# Patient Record
Sex: MALE | Race: WHITE | URBAN - METROPOLITAN AREA
[De-identification: names, ages, dates, MRNs, and addresses within clinical notes are randomized per-mention and may not be internally consistent; named-entity substitution may affect disease eponyms.]

---

## 2020-06-30 ENCOUNTER — OFFICE VISIT (OUTPATIENT)
Dept: CARDIOLOGY CLINIC | Age: 25
End: 2020-06-30

## 2020-06-30 VITALS
WEIGHT: 263.2 LBS | HEIGHT: 74 IN | HEART RATE: 75 BPM | SYSTOLIC BLOOD PRESSURE: 118 MMHG | DIASTOLIC BLOOD PRESSURE: 82 MMHG | BODY MASS INDEX: 33.78 KG/M2 | RESPIRATION RATE: 16 BRPM | TEMPERATURE: 98.9 F | OXYGEN SATURATION: 97 %

## 2020-06-30 DIAGNOSIS — R06.02 SHORTNESS OF BREATH: Primary | ICD-10-CM

## 2020-06-30 DIAGNOSIS — I50.9 CONGESTIVE HEART FAILURE, UNSPECIFIED HF CHRONICITY, UNSPECIFIED HEART FAILURE TYPE (HCC): ICD-10-CM

## 2020-06-30 DIAGNOSIS — F17.200 NICOTINE DEPENDENCE, UNCOMPLICATED, UNSPECIFIED NICOTINE PRODUCT TYPE: ICD-10-CM

## 2020-06-30 DIAGNOSIS — R73.09 HIGH GLUCOSE LEVEL: ICD-10-CM

## 2020-06-30 DIAGNOSIS — F17.220 CHEWING TOBACCO NICOTINE DEPENDENCE WITHOUT COMPLICATION: ICD-10-CM

## 2020-06-30 DIAGNOSIS — E61.1 LOW IRON: ICD-10-CM

## 2020-06-30 DIAGNOSIS — E55.9 VITAMIN D DEFICIENCY: ICD-10-CM

## 2020-06-30 DIAGNOSIS — R53.83 FATIGUE, UNSPECIFIED TYPE: ICD-10-CM

## 2020-06-30 RX ORDER — SACUBITRIL AND VALSARTAN 49; 51 MG/1; MG/1
1 TABLET, FILM COATED ORAL 2 TIMES DAILY
Qty: 60 TAB | Refills: 2 | Status: SHIPPED | OUTPATIENT
Start: 2020-06-30 | End: 2020-07-21 | Stop reason: DRUGHIGH

## 2020-06-30 RX ORDER — FUROSEMIDE 20 MG/1
20 TABLET ORAL DAILY
COMMUNITY
Start: 2020-06-10 | End: 2020-06-30 | Stop reason: ALTCHOICE

## 2020-06-30 RX ORDER — SACUBITRIL AND VALSARTAN 24; 26 MG/1; MG/1
1 TABLET, FILM COATED ORAL DAILY
COMMUNITY
Start: 2020-06-13 | End: 2020-06-30 | Stop reason: DRUGHIGH

## 2020-06-30 RX ORDER — CARVEDILOL 6.25 MG/1
12.5 TABLET ORAL 2 TIMES DAILY WITH MEALS
COMMUNITY
Start: 2020-06-22 | End: 2020-10-02

## 2020-06-30 NOTE — LETTER
6/30/2020 3:24 PM 
 
Patient:  Michael Esquivel YOB: 1995 Date of Visit: 6/30/2020 Dear Neris Alejandro [place] [id number] [place] [id number] VIA Facsimile: [phone]: Thank you for referring Mr. Michael Esquivel to me for evaluation/treatment. Below are the relevant portions of my assessment and plan of care. If you have questions, please do not hesitate to call me. I look forward to following Mr. David Hughes along with you. Sincerely, Darius Lynn MD

## 2020-06-30 NOTE — PATIENT INSTRUCTIONS
Medication changes:    STOP furosemide (Lasix)    INCREASE sacubitril-valsartan (Entresto) to 49mg/51mg- You may take two 24/26mg tablets by mouth twice daily until you run out. The new prescription will be for one 49mg/51mg tablet by mouth twice daily. Testing Ordered:    Lab work, including [facility], has been ordered. You will be contacted for any abnormal results. Lab work has been ordered. Please present to a [facility] location of your choice with the orders provided to have lab work done. Please wear gloves and a mask when you present to [facility] and practice diligent hand hygeine before and after your visit. Our office will notify you of any abnormal results. Other Recommendations:      Ensure your drinking an adequate amount of water with a goal of 6-8 eight ounce glasses (1.5-2 liters) of fluid daily. Your urine should be clear and light yellow straw colored. If your blood pressure begins to consistently run below 90/60 and/or you begin to experience dizziness or lightheadedness, please contact the [facility] at [phone]. Follow up 3 weeks with [facility] MD for VIRTUAL VISIT      Please monitor your blood pressures daily prior to medications and 2 hours after taking medications. Bring a written record of your blood pressures to your next appointment. Please monitor your weights daily upon waking and after using the bathroom. Keep a written records of your weights and bring to your next appointment. If you have a weight gain of 3 or more pounds overnight OR 5 or more pounds in one week please contact our office. Thank you for allowing us the privilege of being a part of your healthcare team! Please do not hesitate to contact our office at [phone] with any questions or concerns.              Heart Failure: Care Instructions  Your Care Instructions     Heart failure occurs when your heart does not pump as much blood as the body needs. Failure does not mean that the heart has stopped pumping but rather that it is not pumping as well as it should. Over time, this causes fluid buildup in your lungs and other parts of your body. Fluid buildup can cause shortness of breath, fatigue, swollen ankles, and other problems. By taking medicines regularly, reducing sodium (salt) in your diet, checking your weight every day, and making lifestyle changes, you can feel better and live longer. Follow-up care is a key part of your treatment and safety. Be sure to make and go to all appointments, and call your doctor if you are having problems. It's also a good idea to know your test results and keep a list of the medicines you take. How can you care for yourself at home? Medicines  · Be safe with medicines. Take your medicines exactly as prescribed. Call your doctor if you think you are having a problem with your medicine. · Do not take any vitamins, over-the-counter medicine, or herbal products without talking to your doctor first. Andree Him not take ibuprofen (Advil or Motrin) and naproxen (Aleve) without talking to your doctor first. They could make your heart failure worse. · You may take some of the following medicine. ? Angiotensin-converting enzyme inhibitors (ACEIs) or angiotensin II receptor blockers (ARBs) reduce the heart's workload, lower blood pressure, and reduce swelling. Taking an ACEI or ARB may lower your chance of needing to be hospitalized. ? Beta-blockers can slow heart rate, decrease blood pressure, and improve your condition. Taking a beta-blocker may lower your chance of needing to be hospitalized. ? Diuretics, also called water pills, reduce swelling. You will get more details on the specific medicines your doctor prescribes. Diet  · Your doctor may suggest that you limit sodium. Your doctor can tell you how much sodium is right for you. An example is less than 3,000 mg a day.  This includes all the salt you eat in cooking or in packaged foods. People get most of their sodium from processed foods. Fast food and restaurant meals also tend to be very high in sodium. · Ask your doctor how much liquid you can drink each day. You may have to limit liquids. Weight  · Weigh yourself without clothing at the same time each day. Record your weight. Call your doctor if you have a sudden weight gain, such as more than 2 to 3 pounds in a day or 5 pounds in a week. (Your doctor may suggest a different range of weight gain.) A sudden weight gain may mean that your heart failure is getting worse. Activity level  · Start light exercise (if your doctor says it is okay). Even if you can only do a small amount, exercise will help you get stronger, have more energy, and manage your weight and your stress. Walking is an easy way to get exercise. Start out by walking a little more than you did before. Bit by bit, increase the amount you walk. · When you exercise, watch for signs that your heart is working too hard. You are pushing yourself too hard if you cannot talk while you are exercising. If you become short of breath or dizzy or have chest pain, stop, sit down, and rest.  · If you feel \"wiped out\" the day after you exercise, walk slower or for a shorter distance until you can work up to a better pace. · Get enough rest at night. Sleeping with 1 or 2 pillows under your upper body and head may help you breathe easier. Lifestyle changes  · Do not smoke. Smoking can make a heart condition worse. If you need help quitting, talk to your doctor about stop-smoking programs and medicines. These can increase your chances of quitting for good. Quitting smoking may be the most important step you can take to protect your heart. · Limit alcohol to 2 drinks a day for men and 1 drink a day for women. Too much alcohol can cause health problems. · Avoid getting sick from colds and the flu. Get a pneumococcal vaccine shot.  If you have had one before, ask your doctor whether you need another dose. Get a flu shot each year. If you must be around people with colds or the flu, wash your hands often. When should you call for help? Call 911 if you have symptoms of sudden heart failure such as:  · You have severe trouble breathing. · You cough up pink, foamy mucus. · You have a new irregular or rapid heartbeat. Call your doctor now or seek immediate medical care if:  · You have new or increased shortness of breath. · You are dizzy or lightheaded, or you feel like you may faint. · You have sudden weight gain, such as more than 2 to 3 pounds in a day or 5 pounds in a week. (Your doctor may suggest a different range of weight gain.)  · You have increased swelling in your legs, ankles, or feet. · You are suddenly so tired or weak that you cannot do your usual activities. Watch closely for changes in your health, and be sure to contact your doctor if you develop new symptoms. Where can you learn more? Go to http://nellie-jose.info/  Enter U484 in the search box to learn more about \"Heart Failure: Care Instructions. \"  Current as of: December 16, 2019               Content Version: 12.5  © 3373-8554 Healthwise, Incorporated. Care instructions adapted under license by [employer] (which disclaims liability or warranty for this information). If you have questions about a medical condition or this instruction, always ask your healthcare professional. [place] [address] any warranty or liability for your use of this information. Low Sodium Diet (2,000 Milligram): Care Instructions  Your Care Instructions     Too much sodium causes your body to hold on to extra water. This can raise your blood pressure and force your heart and kidneys to work harder. In very serious cases, this could cause you to be put in the hospital. It might even be life-threatening.  By limiting sodium, you will feel better and lower your risk of serious problems. The most common source of sodium is salt. People get most of the salt in their diet from canned, prepared, and packaged foods. Fast food and restaurant meals also are very high in sodium. Your doctor will probably limit your sodium to less than 2,000 milligrams (mg) a day. This limit counts all the sodium in prepared and packaged foods and any salt you add to your food. Follow-up care is a key part of your treatment and safety. Be sure to make and go to all appointments, and call your doctor if you are having problems. It's also a good idea to know your test results and keep a list of the medicines you take. How can you care for yourself at home? Read food labels  · Read labels on cans and food packages. The labels tell you how much sodium is in each serving. Make sure that you look at the serving size. If you eat more than the serving size, you have eaten more sodium. · Food labels also tell you the Percent Daily Value for sodium. Choose products with low Percent Daily Values for sodium. · Be aware that sodium can come in forms other than salt, including monosodium glutamate (MSG), sodium citrate, and sodium bicarbonate (baking soda). MSG is often added to Asian food. When you eat out, you can sometimes ask for food without MSG or added salt. Buy low-sodium foods  · Buy foods that are labeled \"unsalted\" (no salt added), \"sodium-free\" (less than 5 mg of sodium per serving), or \"low-sodium\" (less than 140 mg of sodium per serving). Foods labeled \"reduced-sodium\" and \"light sodium\" may still have too much sodium. Be sure to read the label to see how much sodium you are getting. · Buy fresh vegetables, or frozen vegetables without added sauces. Buy low-sodium versions of canned vegetables, soups, and other canned goods. Prepare low-sodium meals  · Cut back on the amount of salt you use in cooking. This will help you adjust to the taste. Do not add salt after cooking.  One teaspoon of salt has about 2,300 mg of sodium. · Take the salt shaker off the table. · Flavor your food with garlic, lemon juice, onion, vinegar, herbs, and spices. Do not use soy sauce, lite soy sauce, steak sauce, onion salt, garlic salt, celery salt, mustard, or ketchup on your food. · Use low-sodium salad dressings, sauces, and ketchup. Or make your own salad dressings and sauces without adding salt. · Use less salt (or none) when recipes call for it. You can often use half the salt a recipe calls for without losing flavor. Other foods such as rice, pasta, and grains do not need added salt. · Rinse canned vegetables, and cook them in fresh water. This removes somebut not allof the salt. · Avoid water that is naturally high in sodium or that has been treated with water softeners, which add sodium. Call your local water company to find out the sodium content of your water supply. If you buy bottled water, read the label and choose a sodium-free brand. Avoid high-sodium foods  · Avoid eating:  ? Smoked, cured, salted, and canned meat, fish, and poultry. ? Ham, fajardo, hot dogs, and luncheon meats. ? Regular, hard, and processed cheese and regular peanut butter. ? Crackers with salted tops, and other salted snack foods such as pretzels, chips, and salted popcorn. ? Frozen prepared meals, unless labeled low-sodium. ? Canned and dried soups, broths, and bouillon, unless labeled sodium-free or low-sodium. ? Canned vegetables, unless labeled sodium-free or low-sodium. ? [place] fries, pizza, tacos, and other fast foods. ? Pickles, olives, ketchup, and other condiments, especially soy sauce, unless labeled sodium-free or low-sodium. Where can you learn more? Go to http://nellie-jose.info/  Enter V843 in the search box to learn more about \"Low Sodium Diet (2,000 Milligram): Care Instructions. \"  Current as of: August 22, 2019               Content Version: 12.5  © 6060-6330 Healthwise, Incorporated.    Care instructions adapted under license by [employer] (which disclaims liability or warranty for this information). If you have questions about a medical condition or this instruction, always ask your healthcare professional. [address] any warranty or liability for your use of this information     Potassium-Restricted Diet: Care Instructions  Your Care Instructions     Potassium is a mineral. It helps keep the right mix of fluids in your body. It also helps your nerves and muscles work as they should. Most people get the potassium they need from the foods they eat. But if you have certain health problems, such as kidney disease, you may need to be careful about how much potassium you get. This is because too much potassium can be harmful. You can control how much potassium you get. You can do this if you eat foods that don't have much of it and you don't eat foods that have lots of it. Follow-up care is a key part of your treatment and safety. Be sure to make and go to all appointments, and call your doctor if you are having problems. It's also a good idea to know your test results and keep a list of the medicines you take. How can you care for yourself at home? · Limit foods that are high in potassium. Potassium is in many foods, such as vegetables, fruits, and milk products. High-potassium foods include:  ? Fruits such as bananas, oranges, and cantaloupe. ? Tomatoes. ? Broccoli. ? Milk. ? Spinach. ? Potatoes and sweet potatoes. · Eat foods that don't have as much potassium. These low-potassium foods include:  ? Fruits such as applesauce, pineapple, grapes, blueberries, and raspberries. ? Cucumbers. ? Hummus. ? White or brown rice. ? Spaghetti. ? Tortillas. ? Macaroni. · Do not use a salt substitute or \"lite\" salt unless you talk to your doctor first. These often are very high in potassium.   · Be sure to tell your doctor about any prescription or over-the-counter medicines you are taking. Some medicines can increase the potassium in your body. Where can you learn more? Go to http://nellie-jose.info/  Enter M262 in the search box to learn more about \"Potassium-Restricted Diet: Care Instructions. \"  Current as of: August 22, 2019               Content Version: 12.5  © 5888-4121 [employer]. Care instructions adapted under license by [facility] (which disclaims liability or warranty for this information). If you have questions about a medical condition or this instruction, always ask your healthcare professional. [address] any warranty or liability for your use of this information     Heart-Healthy Diet: Care Instructions  Your Care Instructions     A heart-healthy diet has lots of vegetables, fruits, nuts, beans, and whole grains, and is low in salt. It limits foods that are high in saturated fat, such as meats, cheeses, and fried foods. It may be hard to change your diet, but even small changes can lower your risk of heart attack and heart disease. Follow-up care is a key part of your treatment and safety. Be sure to make and go to all appointments, and call your doctor if you are having problems. It's also a good idea to know your test results and keep a list of the medicines you take. How can you care for yourself at home? Watch your portions  · Learn what a serving is. A \"serving\" and a \"portion\" are not always the same thing. Make sure that you are not eating larger portions than are recommended. For example, a serving of pasta is ½ cup. A serving size of meat is 2 to 3 ounces. A 3-ounce serving is about the size of a deck of cards. Measure serving sizes until you are good at [place]" them. Keep in mind that restaurants often serve portions that are 2 or 3 times the size of one serving. · To keep your energy level up and keep you from feeling hungry, eat often but in smaller portions.   · Eat only the number of calories you need to stay at a healthy weight. If you need to lose weight, eat fewer calories than your body burns (through exercise and other physical activity). Eat more fruits and vegetables  · Eat a variety of fruit and vegetables every day. Dark green, deep orange, red, or yellow fruits and vegetables are especially good for you. Examples include spinach, carrots, peaches, and berries. · Keep carrots, celery, and other veggies handy for snacks. Buy fruit that is in season and store it where you can see it so that you will be tempted to eat it. · Cook dishes that have a lot of veggies in them, such as stir-fries and soups. Limit saturated and trans fat  · Read food labels, and try to avoid saturated and trans fats. They increase your risk of heart disease. · Use olive or canola oil when you cook. · Bake, broil, grill, or steam foods instead of frying them. · Choose lean meats instead of high-fat meats such as hot dogs and sausages. Cut off all visible fat when you prepare meat. · Eat fish, skinless poultry, and meat alternatives such as soy products instead of high-fat meats. Soy products, such as tofu, may be especially good for your heart. · Choose low-fat or fat-free milk and dairy products. Eat foods high in fiber  · Eat a variety of grain products every day. Include whole-grain foods that have lots of fiber and nutrients. Examples of whole-grain foods include oats, whole wheat bread, and brown rice. · Buy whole-grain breads and cereals, instead of white bread or pastries. Limit salt and sodium  · Limit how much salt and sodium you eat to help lower your blood pressure. · Taste food before you salt it. Add only a little salt when you think you need it. With time, your taste buds will adjust to less salt. · Eat fewer snack items, fast foods, and other high-salt, processed foods. Check food labels for the amount of sodium in packaged foods.   · Choose low-sodium versions of canned goods (such as soups, vegetables, and beans). Limit sugar  · Limit drinks and foods with added sugar. These include candy, desserts, and soda pop. Limit alcohol  · Limit alcohol to no more than 2 drinks a day for men and 1 drink a day for women. Too much alcohol can cause health problems. When should you call for help? Watch closely for changes in your health, and be sure to contact your doctor if:  · You would like help planning heart-healthy meals. Where can you learn more? Go to http://nellieVoradiusjose.info/  Enter V137 in the search box to learn more about \"Heart-Healthy Diet: Care Instructions. \"  Current as of: August 22, 2019               Content Version: 12.5  © 4559-8046 Healthwise, Incorporated. Care instructions adapted under license by [employer] (which disclaims liability or warranty for this information). If you have questions about a medical condition or this instruction, always ask your healthcare professional. [place] [address] any warranty or liability for your use of this information. Stopping Smokeless Tobacco Use: Care Instructions  Your Care Instructions     Smokeless tobacco comes in many forms, such as snuff and chewing tobacco:  · Snuff is finely ground tobacco sold in cans or pouches. Most of the time, snuff is used by putting a \"pinch\" or \"dip\" between the lower lip or cheek and the gum. · Chewing tobacco is sold as loose leaves, plugs, or twists. It is chewed or placed between the cheek and the gum or teeth. There are plenty of reasons to stop using smokeless tobacco. These products are harmful. They are not risk-free alternatives to smoking. Smokeless tobacco contains nicotine, which is addicting.  Though using smokeless tobacco is less harmful than smoking cigarettes, it can cause serious health problems, such as:  · White patches or red sores in your mouth that can turn into mouth cancer involving the lip, tongue, or cheek.  · Tooth loss and other dental problems. · Gum disease. Your gums may pull away from your teeth and not grow back. People who use smokeless tobacco crave the nicotine in it. Giving up smokeless tobacco is much harder than simply changing a habit. Your body has to stop craving the nicotine. It is hard to quit, but you can do it. Many tools are available for people who want to quit using smokeless tobacco. You may find that combining tools works best for you. There are several steps to quitting. First you get ready to quit. Then you get support to help you. After that, you learn new skills and behaviors to quit. For many people, a necessary step is getting and using medicine. Your doctor will help you set up the plan that best meets your needs. You may want to attend a tobacco cessation program. When you choose a program, look for one that has proven success. Ask your doctor for ideas. You will greatly increase your chances of success if you take medicine as well as get counseling or join a cessation program.  Some of the changes you feel when you first quit smokeless tobacco are uncomfortable. Your body will miss the nicotine at first, and you may feel short-tempered and grumpy. You may have trouble sleeping or concentrating. Medicine can help you deal with these symptoms. You may struggle with changing your habits and rituals. The last step is the tricky one: Be prepared for the urge to use smokeless tobacco to continue for a time. This is a lot to deal with, but keep at it. You will feel better. Follow-up care is a key part of your treatment and safety. Be sure to make and go to all appointments, and call your doctor if you are having problems. It's also a good idea to know your test results and keep a list of the medicines you take. How can you care for yourself at home? · Ask your family, friends, and coworkers for support. You have a better chance of quitting if you have help and support.   · Join a support group for people who are trying to quit using smokeless tobacco.  · Set a quit date. Pick your date carefully so that it is not right in the middle of a big deadline or stressful time. After you quit, do not use smokeless tobacco even once. Get rid of all spit cups, cans, and pouches after your last use. Clean your house and your clothes so that they do not smell of tobacco.  · Learn how to be a non-user. Think about ways you can avoid those things that make you reach for tobacco.  ? Learn some ways to deal with cravings, like calling a friend or going for a walk. Cravings often pass. ? Avoid situations that put you at greatest risk for using smokeless tobacco. For some people, it is hard to spend time with friends without dipping or chewing. For others, they might skip a coffee break with coworkers who smoke or use smokeless tobacco.  ? Change your daily routine. Take a different route to work, or eat a meal in a different place. · Cut down on stress. Calm yourself or release tension by doing an activity you enjoy, such as reading a book, taking a hot bath, or gardening. · Talk to your doctor or pharmacist about nicotine replacement therapy. You still get nicotine, but you do not use tobacco. Nicotine replacement products help you slowly reduce the amount of nicotine you need. Many of these products are available over the counter. They include nicotine patches, gum, lozenges, and inhalers. · Ask your doctor about bupropion (Wellbutrin) or varenicline (Chantix), which are prescription medicines. They do not contain nicotine. They help you by reducing withdrawal symptoms, such as stress and anxiety. · Get regular exercise. Having healthy habits will help your body move past its craving for nicotine. · Be prepared to keep trying. Most people are not successful the first few times they try to quit. Do not get mad at yourself if you use tobacco again.  Make a list of things you learned, and think about when you want to try again, such as next week, next month, or next year. Where can you learn more? Go to http://nellie-jose.info/  Enter M432 in the search box to learn more about \"Stopping Smokeless Tobacco Use: Care Instructions. \"  Current as of: March 12, 2020               Content Version: 12.5  © 0700-6125 [employer]. Care instructions adapted under license by [employer] (which disclaims liability or warranty for this information). If you have questions about a medical condition or this instruction, always ask your healthcare professional. [address] any warranty or liability for your use of this information.

## 2020-06-30 NOTE — PROGRESS NOTES
[facility] Consultation Note      DOS:   6/30/2020  NAME:  Tone Moses   MRN:   [id number]   REFERRING PROVIDER:  Dr. Shalom Bates, [facility]  PRIMARY CARE PHYSICIAN: Anisa Garza, [place]  PRIMARY CARDIOLOGIST: Dr. Martha Alcantara      Chief Complaint:   Chief Complaint   Patient presents with    CHF       HPI: 22y.o. year old male with a history of new onset NICM, frequent PVCs  who presents for further evaluation of his chronic systolic heart failure. He was admitted to [facility] on 5/12/2020 with acute onset of dyspnea and lower extremity edema. He was diuresed and started on GMDT. His SARS-CoV 2 PCR was negative. His viral panel revealed positive coxsackie B5 antibody at 1:64, positive coxsackie B6 antibody at 1:8, parvovirus IgG 8, mycoplasma IgG antibody 457. His EBV IgG > 8. His HIV, Hep C, HBsAg, HHV 6 and CMV were negative. A serum drug screen was negative. His Troponin was elevated at 55 and NT proBNP 8168. His TSH was elevated at 5.42 but free T3 was normal at 3.6 and free T4 was normal at 1.6. His D-dimer was elevated at 2.09. His medications have been limited by symtpomatic hypotension with SBP in the 70s mmHg. He underwent a cardiac MRI on 5/29/2020 which revealed severe biventricular failure with LVEF 19% and RVEF 30%. He did not have gadolinium enhancement. A 24 hour Holter revealed a PVC burden of 19%. Vandana Biswas presents today for further evaluation of his NICM. He has a detailed log of his BP and weight. He denies symptomatic hypotension after starting entresto. He also denies orthopnea, PND, edema, palpitations, syncope. He admits PRATHER and occasional dizziness. He has not returned to work, where he is exposed to extreme heat, often with temps > 100 F. He is currently on short term disability. Vandana Biswas lives with his parents and he has primary custody of his 1year old daughter. He has not consumed alcohol since his diagnosis and denies any use of illicit drugs.  He formerly smoked cigarettes on occasion but quit 1 year ago. He continues to use smokeless tobacco, dipping twice daily and using 1/2 can a day. History:  Past Medical History:   Diagnosis Date    Frequent PVCs     NICM (nonischemic cardiomyopathy) ([facility] [address]      No past surgical history on file. Social History     Socioeconomic History    Marital status: SINGLE     Spouse name: Not on file    Number of children: 1    Years of education: Not on file    Highest education level: Not on file   Occupational History    Not on file   Social Needs    Financial resource strain: Not on file    Food insecurity     Worry: Not on file     Inability: Not on file    Transportation needs     Medical: Not on file     Non-medical: Not on file   Tobacco Use    Smoking status: Former Smoker     Last attempt to quit: 2019     Years since quittin.4    Smokeless tobacco: Current User     Types: Chew   Substance and Sexual Activity    Alcohol use: Not Currently    Drug use: Never    Sexual activity: Not on file   Lifestyle    Physical activity     Days per week: Not on file     Minutes per session: Not on file    Stress: Not on file   Relationships    Social connections     Talks on phone: Not on file     Gets together: Not on file     Attends Evangelical service: Not on file     Active member of club or organization: Not on file     Attends meetings of clubs or organizations: Not on file     Relationship status: Not on file    Intimate partner violence     Fear of current or ex partner: Not on file     Emotionally abused: Not on file     Physically abused: Not on file     Forced sexual activity: Not on file   Other Topics Concern    Not on file   Social History Narrative    Not on file     No family history on file. Current Medications:     Current Outpatient Medications on File Prior to Visit   Medication Sig Dispense Refill    carvediloL (COREG) 6.25 mg tablet Take 12.5 mg by mouth daily.        No current facility-administered medications on file prior to visit. Allergies: No Known Allergies    ROS:      Review of Systems   Constitutional: Negative. HENT: Negative. Eyes: Negative. Respiratory: Positive for shortness of breath. Cardiovascular: Negative for orthopnea, leg swelling and PND. Genitourinary: Negative. Musculoskeletal: Negative. Skin: Negative. Neurological: Positive for dizziness. Endo/Heme/Allergies: Negative. Psychiatric/Behavioral: Negative. Admission Weight: Last Weight   Weight: 263 lb 3.2 oz (119.4 kg) Weight: 263 lb 3.2 oz (119.4 kg)       Physical Exam:   Vitals:    Visit Vitals  /82 (BP 1 Location: Left arm, BP Patient Position: Sitting)   Pulse 75   Temp 98.9 °F (37.2 °C) (Oral)   Resp 16   Ht 6' 2\" (1.88 m)   Wt 263 lb 3.2 oz (119.4 kg)   SpO2 97%   BMI 33.79 kg/m²       Sergio Chang underwent a 6 min walk test. His initial O2  saturation was 98 % and his baseline heart rate was 96 BPM. He walked a distance of 319.34 meters. His post O2  saturation was 93 % and his post walk heart rate was 76 BPM.        Physical Exam  Constitutional:       Appearance: Normal appearance. HENT:      Head: Normocephalic and atraumatic. Nose: Nose normal.      Mouth/Throat:      Mouth: Mucous membranes are dry. Eyes:      Pupils: Pupils are equal, round, and reactive to light. Neck:      Musculoskeletal: Normal range of motion and neck supple. Cardiovascular:      Rate and Rhythm: Normal rate and regular rhythm. Pulses: Normal pulses. Heart sounds: Murmur present. Systolic murmur present with a grade of 2/6. Gallop present. S3 sounds present. Pulmonary:      Effort: Pulmonary effort is normal.      Breath sounds: Normal breath sounds. Abdominal:      General: Abdomen is flat. Bowel sounds are normal.      Palpations: Abdomen is soft. Musculoskeletal: Normal range of motion. General: No swelling.    Skin:     General: Skin is warm and dry. Neurological:      General: No focal deficit present. Mental Status: He is alert and oriented to person, place, and time. Recent Labs: No flowsheet data found. EK2020  Sinus  Rhythm  - frequent multiform ectopic ventricular beats   # VECs = 6, # types 2  -Left axis for age -possible anterior fascicular block.    -Poor R-wave progression -nondiagnostic for this age. -  Nonspecific T-abnormality  -Nondiagnostic for age. Echocardiogram:   2020  ABNORMAL RHYTHM. SEVERELY DILATED LEFT VENTRICULAR CAVITY SIZE WITH SEVERELY DECREASED SYSTOLIC FUNCTION. GLOBAL LV HYPOKINESIS. BIATRIAL ENLARGEMENT. DILATED RIGHT VENTRICLE WITH DECREASED SYSTOLIC FUNCTION. MILD + TRICUSPID VALVE REGURGITATION. MILD MITRAL VALVE  REGURGITATION. NO SIGNIFICANT IMPROVEMENT COMPARED WITH THE PREVIOUS ECHO PERFORMED ON 20. Clinical Indications: PVC (premature ventricular contraction); NICM (nonischemic cardiomyopathy) ([address]    Biventricular failure ([facility] [address]   ICD Codes: I49.3; I42.8; I50.82 Technical Quality: Adequate     MEASUREMENTS:  (Male / Female) Normal Values   2D ECHO    RV Diameter                       5.4 cm                3.7-8.9   LV Diastolic Diameter Base LX     8 cm                  4.2-5.8 / 2.7-3.8   LV Systolic Diameter Base LX      7.6 cm                2.5-4.0 / 5.2-5.7   IVS Diastolic Thickness           1.1 cm                0.6-1.0 / 1.5-7.3   LVPW Diastolic Thickness          1.1 cm                0.6-1.0 / 3.3-0.2   LA Systolic Diameter LX           4.4 cm                2.1-3.7 cm   Aortic Root Diameter              2.7 cm                1.0-0.5 cm   LA Systolic Pressure              30.5 mmHg   LA Area 4C View                   28.9 cm²   LA Area 2C View                   28.5 cm²   LA Length 4C                      6.2 cm   LA Length 2C                      6.1 cm   LA Volume                         106 cm³   PV Peak Gradient                  1 mmHg       DOPPLER    AV Peak Velocity                  79.5 cm/s   AV Peak Gradient                  2.5 mmHg   LVOT Peak Velocity                51.3 cm/s   LVOT Peak Gradient                1.1 mmHg   Mitral E Point Velocity           89.3 cm/s   Mitral  A Point Velocity          42 cm/s   Mitral E to A Ratio               2.1                   1.0 to 1.5   Mitral E to LV E' Septal Ratio    19.4   Mitral E to LV E' Lateral Ratio   22.9   MV Deceleration Time              102 ms                160-240 ms   E Prime Velocity                  3.9 cm/s   PV Peak Velocity                  54.3 cm/s   PV Peak Gradient                  1.2 mmHg   TV Peak Velocity                  69.6 cm/s   TR Peak Velocity                  2.9 m/s   TR Peak Gradient                  32.7 mmHg      Cardiac MRI:   6/2/2020  1. No delayed myocardial enhancement to suggest scar/fibrosis or infiltrative cardiomyopathy. 2. Severely dilated LV and RV. 3. Biatrial enlargement. 4. Severely depressed LV systolic function (LVEF 04%). Accurate measurement of LV size and function is limited due to arrhythmia artifact. 5. Severely depressed RV systolic function (RVEF 37%). Accurate measurement of RV size and function is limited due to arrhythmia artifact. 6. Moderate mitral regurgitation. 7. Mild tricuspid regurgitation. 8. Small pericardial effusion. 9. Small bilateral pleural effusions    Electronically signed by [facility]: Adolfo Alcantara MD [id number] 14:56:36     Radiologic interpretation of noncardiac findings: The imaged portions of the lungs demonstrate small pleural effusions. The imaged portions of the abdominal viscera demonstrates a small amount of perihepatic ascites. No suspicious osseous findings. CONCLUSION:  No significant noncardiac findings. Signed By: Yang Ramos MD on 6/2/2020 12:35 PM      Radiology (CXR, CT scans):   CTA - 5/13/2020  1. Negative for pulmonary embolism.   2. Trace right pleural effusion and central bronchial airway thickening. 3. Basilar predominant vascular congestion. 4. Cardiomegaly. Impression / Plan:   1. NICM - Stage C, NYHA Class III, LVEF 19% by cMRI   On entresto, carvedilol, furosemide   Viral panel notable for postive abs to coxsackie, parvovirus B19, mycoplasma, EBV   cMRI revealed biventricular failure with no gadolinium enhancement   Recommend [facility] genetic screen   Given \"Living with Heart Failure\" patient education manual   Recommend low sodium diet   Recommend daily weights and BP measurements   Avoid all cardiotoxic drugs - alcohol, tobacco   Discontinue furosemide   Increase entresto to 49/51 mg, 1 tablet twice daily   Check TpnI, BMP, NT proBNP, BMP, vitamin D, iron profile, Hga1c, TFTs   Discussed treatment strategy with GDMT and potential for advanced therapies including heart transplant and LVAD   Follow up with [facility] via virtual visit in 3 weeks     2. High Risk of SCD   Encourage continued use of LifeVest    3. Frequent PVCs - 19% on 24 hour Holter   Continue to optimize GDMT   PVC ablation on 7/10/2020   Keep K> 4 and Mg> 2     4. Functional Mitral Regurgitation   Reassess once GDMT is optimized   Repeat TTE in August    5. History of Nicotine Addiction   Former cigarette use   Currently uses smokeless tobacco   Discussed transplant requirements to be tobacco free for > 6 months - patient understands   Does not wish to take Wellbutrin   Tobacco cessation counseling, > 10 minutes    6. STOP BANG 4   Recommend outpatient PSG    7. Macrocytic Anemia   Check vitamin B12 and folate        Haydee Greene MD, [facility], [address] [place] [address]  Chief of Cardiology, [facility] Director  [address] [place] [address] [place]  [address] [facility], [address] [place] [address], [address] [place] [address]  Office [phone]  Fax [phone]

## 2020-06-30 NOTE — PROGRESS NOTES
1. Do you Snore loudly (loud enough to be heart through closed doors or your bed-partner elbows you for snoring at night)? Yes    2. Do you often feel Tired, fatigued, or sleepy during the daytime (such as falling asleep during driving or talking to someone)? Yes    3. Has anyone Observed you stop breathing or choking/gasping during your sleep? No    4. Do you have or are being treated for high blood Pressure? Yes    5. Is your Body mass index more than 35? No    6. Age older than 48? No    7. Large Neck size? For male, is your shirt collar 17 inches / 43 cm or larger? For female, is your shirt collar 16 inches / 41 cm or larger? No    8. Gender = male?  yes      Score: 4- Intermediate risk    KATHE low risk - yes to 0 - 2 questions  KATHE - intermediate risk - yes to 3 - 4 questions  KATHE - high risk:  yes to 5-8 questions  Or yes to 2 or more of 4 STOP questions + male gender  Or yes to 2 or more of 4 STOP questions + BMI greater than 35  Or yes to 2 or more of 4 STOP questions + neck circumference 17 inches/43 cm in male or 16 inches/41 cm in female

## 2020-06-30 NOTE — LETTER
6/30/2020 3:24 PM 
 
Patient:  Lance Beckham YOB: 1995 Date of Visit: 6/30/2020 Dear Karri Hernandez MD 
[address] [place] [address] VIA Facsimile: [phone]: Thank you for referring Mr. Lance Beckham to me for evaluation/treatment. Below are the relevant portions of my assessment and plan of care. If you have questions, please do not hesitate to call me. I look forward to following Mr. Jd Scott along with you. Sincerely, Cherelle Chen MD

## 2020-07-01 LAB
EST. AVERAGE GLUCOSE BLD GHB EST-MCNC: 134 MG/DL
HBA1C MFR BLD: 6.3 % (ref 4.8–5.6)
MAGNESIUM SERPL-MCNC: 1.7 MG/DL (ref 1.6–2.3)

## 2020-07-07 LAB
25(OH)D3+25(OH)D2 SERPL-MCNC: 41.5 NG/ML (ref 30–100)
BUN SERPL-MCNC: 13 MG/DL (ref 6–20)
BUN/CREAT SERPL: 13 (ref 9–20)
CALCIUM SERPL-MCNC: 9.3 MG/DL (ref 8.7–10.2)
CHLORIDE SERPL-SCNC: 103 MMOL/L (ref 96–106)
CO2 SERPL-SCNC: 24 MMOL/L (ref 20–29)
CREAT SERPL-MCNC: 0.97 MG/DL (ref 0.76–1.27)
FERRITIN SERPL-MCNC: 80 NG/ML (ref 30–400)
FOLATE SERPL-MCNC: 4.8 NG/ML
GLUCOSE SERPL-MCNC: 94 MG/DL (ref 65–99)
IRON SATN MFR SERPL: 16 % (ref 15–55)
IRON SERPL-MCNC: 66 UG/DL (ref 38–169)
NT-PROBNP SERPL-MCNC: 4554 PG/ML (ref 0–86)
POTASSIUM SERPL-SCNC: 4.4 MMOL/L (ref 3.5–5.2)
SODIUM SERPL-SCNC: 142 MMOL/L (ref 134–144)
T3FREE SERPL-MCNC: 3.4 PG/ML (ref 2–4.4)
T4 SERPL-MCNC: 9.2 UG/DL (ref 4.5–12)
TIBC SERPL-MCNC: 417 UG/DL (ref 250–450)
TROPONIN I SERPL-MCNC: 0.01 NG/ML (ref 0–0.04)
TSH SERPL DL<=0.005 MIU/L-ACNC: 1.27 UIU/ML (ref 0.45–4.5)
UIBC SERPL-MCNC: 351 UG/DL (ref 111–343)
VIT B12 SERPL-MCNC: 230 PG/ML (ref 232–1245)

## 2020-07-14 NOTE — PATIENT INSTRUCTIONS
Medication changes:    Increase entresto to 97/103 mg, 1 tablet twice daily (You can take entresto 49/51 mg, 2 tablets twice daily)    Take furosemide 20 mg, 1 tablet daily ONLY IF YOU FEEL SHORT OF BREATH    Start vitamin B12 500 mcgs daily    Please take this to your pharmacy to notify them of the change in medications. Testing Ordered:    Lab work has been ordered. Please present to a [facility] location of your choice with the orders provided to have lab work done. Please wear gloves and a mask when you present to [facility] and practice diligent hand hygeine before and after your visit. Our office will notify you of any abnormal results. [facility] genetic testing results have been reviewed today. Your testing results qualify for complimentary genetic counseling through [facility]. Please visit [email or web address] to schedule your telephone genetic counseling appointment. Your RQ number from your test is [id number]. You will be asked for this number when you schedule the appointment. Other Recommendations:      Ensure your drinking an adequate amount of water with a goal of 6-8 eight ounce glasses (1.5-2 liters) of fluid daily. Your urine should be clear and light yellow straw colored. If your blood pressure begins to consistently run below 90/60 and/or you begin to experience dizziness or lightheadedness, please contact the [facility] at [phone]. Follow up  with [facility] in 4 weeks      Please monitor your blood pressures daily prior to medications and 2 hours after taking medications. Bring a written record of your blood pressures to your next appointment. Please monitor your weights daily upon waking and after using the bathroom. Keep a written records of your weights and bring to your next appointment. If you have a weight gain of 3 or more pounds overnight OR 5 or more pounds in one week please contact our office.        Thank you for allowing us the privilege of being a part of your healthcare team! Please do not hesitate to contact our office at [phone] with any questions or concerns.

## 2020-07-21 ENCOUNTER — VIRTUAL VISIT (OUTPATIENT)
Dept: CARDIOLOGY CLINIC | Age: 25
End: 2020-07-21

## 2020-07-21 DIAGNOSIS — I50.9 CONGESTIVE HEART FAILURE, UNSPECIFIED HF CHRONICITY, UNSPECIFIED HEART FAILURE TYPE (HCC): ICD-10-CM

## 2020-07-21 DIAGNOSIS — I49.3 FREQUENT PVCS: ICD-10-CM

## 2020-07-21 DIAGNOSIS — R06.02 SHORTNESS OF BREATH: Primary | ICD-10-CM

## 2020-07-21 DIAGNOSIS — R06.02 SHORTNESS OF BREATH: ICD-10-CM

## 2020-07-21 RX ORDER — LANOLIN ALCOHOL/MO/W.PET/CERES
500 CREAM (GRAM) TOPICAL DAILY
Qty: 100 TAB | Refills: 3 | Status: SHIPPED | OUTPATIENT
Start: 2020-07-21

## 2020-07-21 RX ORDER — SACUBITRIL AND VALSARTAN 97; 103 MG/1; MG/1
1 TABLET, FILM COATED ORAL 2 TIMES DAILY
Qty: 60 TAB | Refills: 5 | Status: SHIPPED | OUTPATIENT
Start: 2020-07-21 | End: 2020-10-29 | Stop reason: SDUPTHER

## 2020-07-21 NOTE — PROGRESS NOTES
Sherol Seip is a 22 y.o. male who was seen by synchronous (real-time) audio-video technology on 7/21/2020 for CHF and Palpitations (Patient had ablation 7/10 and stated he is still having issues with PVCs)        Assessment & Plan:   1. NICM - Stage C, NYHA Class III, LVEF 19% by cMRI             On entresto, carvedilol             Viral panel notable for postive abs to coxsackie, parvovirus B19, mycoplasma, EBV             cMRI revealed biventricular failure with no gadolinium enhancement             [facility] genetic screen reviewed             Given \"Living with Heart Failure\" patient education manual             Recommend low sodium diet             Recommend daily weights and BP measurements             Avoid all cardiotoxic drugs - alcohol, tobacco             Take furosemide 20 mg, 1 tablet daily ONLY AS NEEDED for shortness of breath             Increase entresto to 97/103 mg, 1 tablet twice daily             Check BMP, NT-proBNP, Mg - lab orders sent to patient, labs to be drawn locally             Discussed treatment strategy with GDMT and potential for advanced therapies including heart transplant and LVAD             Follow up with [facility] in 4 weeks               2. High Risk of SCD             Encourage continued use of LifeVest     3. 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 Functional Mitral Regurgitation             Reassess once GDMT is optimized             Repeat TTE in August     5. History of Nicotine Addiction             Former cigarette use             Last day of smokeless tobacco use - July 9, 2020             Discussed transplant requirements to be tobacco free for > 6 months - patient understands             Does not wish to take Wellbutrin             Tobacco cessation counseling, > 10 minutes     6.  STOP BANG 4             Recommend outpatient PSG - advised patient to contact his PCP to order a PSG locally     7. Macrocytic Anemia             Start vitamin B12 500 mcgs daily           Haydee Carter MD, [facility], [address] [place] [address]  Chief of Cardiology, [facility] [address], [address] [place] [address], [address] [place] [address]  Office [phone]  Fax [phone]    I spent at least 40 minutes on this visit with this established patient. Subjective:       Prior to Admission medications    Medication Sig Start Date End Date Taking? Authorizing Provider   carvediloL (COREG) 6.25 mg tablet Take 12.5 mg by mouth two (2) times daily (with meals). 6/22/20  Yes Provider, Historical   sacubitriL-valsartan (Entresto) 49-51 mg tab tablet Take 1 Tab by mouth two (2) times a day. 6/30/20  Yes Quynh Reyes MD     There is no problem list on file for this patient. Review of Systems   Respiratory: Positive for shortness of breath. Cardiovascular: Positive for palpitations. Neurological: Positive for dizziness.        Objective:     Patient-Reported Vitals 7/21/2020   Patient-Reported Weight 250lb   Patient-Reported Pulse 72 BPM   Patient-Reported Systolic  277   Patient-Reported Diastolic 80        [INSTRUCTIONS:  \"[x]\" Indicates a positive item  \"[]\" Indicates a negative item  -- DELETE ALL ITEMS NOT EXAMINED]    Constitutional: [x] Appears well-developed and well-nourished [x] No apparent distress      [] Abnormal -     Mental status: [x] Alert and awake  [x] Oriented to person/place/time [x] Able to follow commands    [] Abnormal -     Eyes:   EOM    [x]  Normal    [] Abnormal -   Sclera  [x]  Normal    [] Abnormal -          Discharge [x]  None visible   [] Abnormal -     HENT: [x] Normocephalic, atraumatic  [] Abnormal -   [x] Mouth/Throat: Mucous membranes are moist    External Ears [x] Normal  [] Abnormal -    Neck: [x] No visualized mass [] Abnormal -     Pulmonary/Chest: [x] Respiratory effort normal   [x] No visualized signs of difficulty breathing or respiratory distress        [] Abnormal -      Musculoskeletal:   [x] Normal gait with no signs of ataxia         [x] Normal range of motion of neck        [] Abnormal -     Neurological:        [x] No Facial Asymmetry (Cranial nerve 7 motor function) (limited exam due to video visit)          [x] No gaze palsy        [] Abnormal -          Skin:        [x] No significant exanthematous lesions or discoloration noted on facial skin         [] Abnormal -            Psychiatric:       [x] Normal Affect [] Abnormal -        [x] No Hallucinations    Other pertinent observable physical exam findings:-        We discussed the expected course, resolution and complications of the diagnosis(es) in detail. Medication risks, benefits, costs, interactions, and alternatives were discussed as indicated. I advised him to contact the office if his condition worsens, changes or fails to improve as anticipated. He expressed understanding with the diagnosis(es) and plan. Chris Horton, who was evaluated through a patient-initiated, synchronous (real-time) audio-video encounter, and/or his healthcare decision maker, is aware that it is a billable service, with coverage as determined by his insurance carrier. He provided verbal consent to proceed: Yes, and patient identification was verified. It was conducted pursuant to the emergency declaration under the [address] [place] [address] authority and the [employer] General Act. A caregiver was present when appropriate. Ability to conduct physical exam was limited. I was in the office. The patient was at home.       Aniya Coppola MD

## 2020-07-28 DIAGNOSIS — I49.3 FREQUENT PVCS: ICD-10-CM

## 2020-07-28 DIAGNOSIS — I50.9 CONGESTIVE HEART FAILURE, UNSPECIFIED HF CHRONICITY, UNSPECIFIED HEART FAILURE TYPE (HCC): ICD-10-CM

## 2020-07-28 DIAGNOSIS — R06.02 SHORTNESS OF BREATH: ICD-10-CM

## 2020-08-19 ENCOUNTER — TELEPHONE (OUTPATIENT)
Dept: CARDIOLOGY CLINIC | Age: 25
End: 2020-08-19

## 2020-08-19 NOTE — TELEPHONE ENCOUNTER
Telephone Call RE:  Appointment reminder     Outcome:     [x] Patient confirmed appointment   [] Patient rescheduled appointment for    [] Unable to reach   [] Left message              [] Other:     Appointment is a mychart visit at 3:20pm      Leeann Bustamante

## 2020-08-20 ENCOUNTER — VIRTUAL VISIT (OUTPATIENT)
Dept: CARDIOLOGY CLINIC | Age: 25
End: 2020-08-20

## 2020-08-20 DIAGNOSIS — I42.0 DILATED CARDIOMYOPATHY (HCC): Primary | ICD-10-CM

## 2020-08-20 PROCEDURE — 99214 OFFICE O/P EST MOD 30 MIN: CPT | Performed by: INTERNAL MEDICINE

## 2020-08-20 NOTE — PROGRESS NOTES
Rekha Sky is a 22 y.o. male who was seen by synchronous (real-time) audio-video technology on 8/20/2020 for CHF (Patient still having many PVCs. Would like to discuss. )        Assessment & Plan:   1. NICM - Stage C, NYHA Class III, LVEF 19% by cMRI, 18% by TTE             Continue entresto, carvedilol             Start farxiga 10 mg daily             Send patient a list of high potassium foods to avoid             Check BMP, NT proBNP, Mg             Viral panel notable for postive abs to coxsackie, parvovirus B19, mycoplasma, EBV             cMRI revealed biventricular failure with no gadolinium enhancement             [employer] genetic screen reviewed             Given \"Living with Heart Failure\" patient education manual             Recommend low sodium diet             Recommend daily weights and BP measurements             Avoid all cardiotoxic drugs - alcohol, tobacco             Take furosemide 20 mg, 1 tablet daily ONLY AS NEEDED for shortness of breath             Increase entresto to 97/103 mg, 1 tablet twice daily             Check BMP, NT-proBNP, Mg - lab orders sent to patient, labs to be drawn locally             Discussed treatment strategy with GDMT and potential for advanced therapies including heart transplant and LVAD             Follow up with [facility] in 4 weeks               2. High Risk of SCD             Encourage continued use of LifeVest   Discuss AICD with Dr. Alcantara     3. 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 Functional Mitral Regurgitation             Reassess once GDMT is optimized             Repeat TTE on 8/17/2020 with moderate MR     5.  History of Nicotine Addiction             Former cigarette use             Last day of smokeless tobacco use - July 9, 2020             Discussed transplant requirements to be tobacco free for > 6 months - patient understands             Does not wish to take Wellbutrin             Tobacco cessation counseling, > 10 minutes     6. STOP BANG 4             Recommend outpatient PSG - will discuss with his PCP at his next visit     7. Macrocytic Anemia             Start vitamin B12 500 mcgs daily           Haydee Muniz MD, [facility]  Chief of Cardiology, Shanika Singh [address] [place] [address], [address] [place] [address], [address] [place] [address]  Office [phone]  Fax [phone]    I spent at least 40 minutes on this visit with this established patient. Subjective:       Prior to Admission medications    Medication Sig Start Date End Date Taking? Authorizing Provider   cyanocobalamin (VITAMIN B12) 500 mcg tablet Take 1 Tab by mouth daily. 7/21/20  Yes Yesy Reyes MD   sacubitriL-valsartan Mozell Cover)  mg tablet Take 1 Tab by mouth two (2) times a day. 7/21/20  Yes Yesy Reyes MD   carvediloL (COREG) 6.25 mg tablet Take 12.5 mg by mouth two (2) times daily (with meals). 6/22/20  Yes Provider, Historical     There is no problem list on file for this patient. Echocardiogram - TTE at [place]  8/17/2020   Left Ventricle: Markedly increased left ventricular cavity size. Mild concentric left ventricular hypertrophy. Definity contrast used to enhance endocardial border definition. Left Ventricle Severely decreased left ventricular systolic function in a global pattern.  No wall motion   Function: abnormalities. Calculated biplane EF of 18%.  Grade III diastolic dysfunction with elevated LAP. LVEF: 18 %   Left Atrium: Severely dilated left atrium. Left atrial volume index 60 mLm2. Right Ventricle: Moderately dilated right ventricular size. Normal right ventricular global systolic function. Tricuspid Annular Plane Systolic Excursion (TAPSE) is 1.9 cm. Tricuspid Annular Plane Systolic   Velocity (TAPSV) is 9.6 cm/s. Right Atrium: Severely dilated right atrium. Dilated inferior vena cava (IVC). IVC does not collapse >50% with   inspiration consistent with elevated venous pressures. Mitral Valve: Mildly thickened mitral valve leaflets.  Moderate mitral regurgitation. Aortic Valve: Mildly sclerotic, trileaflet aortic valve.  No evidence of aortic stenosis. No evidence of aortic   regurgitation. Tricuspid Valve: Mildly sclerotic tricuspid valve.  Moderate tricuspid regurgitation. Estimated right ventricular   systolic pressure is 40 mmHg (RAP 15). Mild pulmonary  hypertension. Pulmonic Valve: Structurally normal pulmonic valve. No evidence of valvular pulmonic stenosis. Mild pulmonic   regurgitation. Aorta: Not well visualized. Normal aortic root diameter. Pericardium: Normal pericardium. No pericardial effusion. Masses / Shunts: No masses, shunts or thrombi seen. Review of Systems   Respiratory: Positive for shortness of breath. Cardiovascular: Positive for palpitations. Neurological: Positive for dizziness.        Objective:     Patient-Reported Vitals 8/20/2020   Patient-Reported Weight 257lb   Patient-Reported Pulse 54BPM   Patient-Reported Systolic  271   Patient-Reported Diastolic 78        [INSTRUCTIONS:  \"[x]\" Indicates a positive item  \"[]\" Indicates a negative item  -- DELETE ALL ITEMS NOT EXAMINED]    Constitutional: [x] Appears well-developed and well-nourished [x] No apparent distress      [] Abnormal -     Mental status: [x] Alert and awake  [x] Oriented to person/place/time [x] Able to follow commands    [] Abnormal -     Eyes:   EOM    [x]  Normal    [] Abnormal -   Sclera  [x]  Normal    [] Abnormal -          Discharge [x]  None visible   [] Abnormal -     HENT: [x] Normocephalic, atraumatic  [] Abnormal -   [x] Mouth/Throat: Mucous membranes are moist    External Ears [x] Normal  [] Abnormal -    Neck: [x] No visualized mass [] Abnormal -     Pulmonary/Chest: [x] Respiratory effort normal   [x] No visualized signs of difficulty breathing or respiratory distress        [] Abnormal -      Musculoskeletal:   [x] Normal gait with no signs of ataxia         [x] Normal range of motion of neck        [] Abnormal -     Neurological:        [x] No Facial Asymmetry (Cranial nerve 7 motor function) (limited exam due to video visit)          [x] No gaze palsy        [] Abnormal -          Skin:        [x] No significant exanthematous lesions or discoloration noted on facial skin         [] Abnormal -            Psychiatric:       [x] Normal Affect [] Abnormal -        [x] No Hallucinations    Other pertinent observable physical exam findings:-        We discussed the expected course, resolution and complications of the diagnosis(es) in detail. Medication risks, benefits, costs, interactions, and alternatives were discussed as indicated. I advised him to contact the office if his condition worsens, changes or fails to improve as anticipated. He expressed understanding with the diagnosis(es) and plan. Adelle Canavan, who was evaluated through a patient-initiated, synchronous (real-time) audio-video encounter, and/or his healthcare decision maker, is aware that it is a billable service, with coverage as determined by his insurance carrier. He provided verbal consent to proceed: Yes, and patient identification was verified. It was conducted pursuant to the emergency declaration under the [facility], [address] [place] [address] authority and the [employer] Act. A caregiver was present when appropriate. Ability to conduct physical exam was limited. I was in the office. The patient was at home.       Estuardo Hirsch MD

## 2020-08-20 NOTE — PATIENT INSTRUCTIONS
Medication changes: 
 
Start farxiga 10 mg, 1 tablet daily Please take this to your pharmacy to notify them of the change in medications. Testing Ordered: 
 
Lab work has been ordered. Please present to a [facility] location of your choice with the orders provided to have lab work done. Please wear gloves and a mask when you present to [facility] and practice diligent hand hygeine before and after your visit. Our office will notify you of any abnormal results. Other Recommendations:  
 
Please follow a low potassium diet and avoid high potassium foods - see the list of high potassium foods attached Ensure your drinking an adequate amount of water with a goal of 6-8 eight ounce glasses (1.5-2 liters) of fluid daily. Your urine should be clear and light yellow straw colored. If your blood pressure begins to consistently run below 90/60 and/or you begin to experience dizziness or lightheadedness, please contact the [facility] at [phone]. Follow up in 4 weeks with [facility] Please monitor your blood pressures daily prior to medications and 2 hours after taking medications. Bring a written record of your blood pressures to your next appointment. Please monitor your weights daily upon waking and after using the bathroom. Keep a written records of your weights and bring to your next appointment. If you have a weight gain of 3 or more pounds overnight OR 5 or more pounds in one week please contact our office. Thank you for allowing us the privilege of being a part of your healthcare team! Please do not hesitate to contact our office at [phone] with any questions or concerns. Potassium-Restricted Diet: Care Instructions Your Care Instructions Potassium is a mineral. It helps keep the right mix of fluids in your body. It also helps your nerves and muscles work as they should. Most people get the potassium they need from the foods they eat. But if you have certain health problems, such as kidney disease, you may need to be careful about how much potassium you get. This is because too much potassium can be harmful. You can control how much potassium you get. You can do this if you eat foods that don't have much of it and you don't eat foods that have lots of it. Follow-up care is a key part of your treatment and safety. Be sure to make and go to all appointments, and call your doctor if you are having problems. It's also a good idea to know your test results and keep a list of the medicines you take. How can you care for yourself at home? · Limit foods that are high in potassium. Potassium is in many foods, such as vegetables, fruits, and milk products. High-potassium foods include: ? Fruits such as bananas, oranges, and cantaloupe. ? Tomatoes. ? Broccoli. ? Milk. ? Spinach. ? Potatoes and sweet potatoes. · Eat foods that don't have as much potassium. These low-potassium foods include: ? Fruits such as applesauce, pineapple, grapes, blueberries, and raspberries. ? Cucumbers. ? Hummus. ? White or brown rice. ? Spaghetti. ? Tortillas. ? Macaroni. · Do not use a salt substitute or \"lite\" salt unless you talk to your doctor first. These often are very high in potassium. · Be sure to tell your doctor about any prescription or over-the-counter medicines you are taking. Some medicines can increase the potassium in your body. Where can you learn more? Go to http://www.gray.com/ Enter M262 in the search box to learn more about \"Potassium-Restricted Diet: Care Instructions. \" Current as of: August 22, 2019               Content Version: 12.5 © 5350-4854 Healthwise, Incorporated. Care instructions adapted under license by [employer] (which disclaims liability or warranty for this information).  If you have questions about a medical condition or this instruction, always ask your healthcare professional. [place] [address] any warranty or liability for your use of this information.

## 2020-08-26 ENCOUNTER — TELEPHONE (OUTPATIENT)
Dept: CARDIOLOGY CLINIC | Age: 25
End: 2020-08-26

## 2020-08-26 NOTE — TELEPHONE ENCOUNTER
Contacted pharmacy to inquire if [facility] required prior auth. Per [place] with the pharmacy patient has already picked up medication for $0 copay. Erik Delarosa RN.

## 2020-09-24 ENCOUNTER — DOCUMENTATION ONLY (OUTPATIENT)
Dept: CARDIOLOGY CLINIC | Age: 25
End: 2020-09-24

## 2020-09-24 ENCOUNTER — TELEPHONE (OUTPATIENT)
Dept: CARDIOLOGY CLINIC | Age: 25
End: 2020-09-24

## 2020-09-24 NOTE — TELEPHONE ENCOUNTER
Patient needs to reschedule his appointment for September 29th. He had an ablation on Monday. Will call him next Monday September 28th  to reschedule with Dr. Marcel Palacio.

## 2020-09-28 ENCOUNTER — TELEPHONE (OUTPATIENT)
Dept: CARDIOLOGY CLINIC | Age: 25
End: 2020-09-28

## 2020-10-02 ENCOUNTER — OFFICE VISIT (OUTPATIENT)
Dept: CARDIOLOGY CLINIC | Age: 25
End: 2020-10-02

## 2020-10-02 VITALS
BODY MASS INDEX: 31.16 KG/M2 | HEART RATE: 75 BPM | HEIGHT: 74 IN | TEMPERATURE: 98 F | SYSTOLIC BLOOD PRESSURE: 124 MMHG | WEIGHT: 242.8 LBS | RESPIRATION RATE: 14 BRPM | OXYGEN SATURATION: 98 % | DIASTOLIC BLOOD PRESSURE: 80 MMHG

## 2020-10-02 DIAGNOSIS — D53.9 MACROCYTIC ANEMIA: ICD-10-CM

## 2020-10-02 DIAGNOSIS — I49.3 FREQUENT PVCS: ICD-10-CM

## 2020-10-02 DIAGNOSIS — I50.20 NYHA CLASS 3 HEART FAILURE WITH REDUCED EJECTION FRACTION (HCC): ICD-10-CM

## 2020-10-02 DIAGNOSIS — R00.2 PALPITATIONS: Primary | ICD-10-CM

## 2020-10-02 DIAGNOSIS — I42.8 NICM (NONISCHEMIC CARDIOMYOPATHY) (HCC): ICD-10-CM

## 2020-10-02 DIAGNOSIS — R06.09 DOE (DYSPNEA ON EXERTION): ICD-10-CM

## 2020-10-02 PROBLEM — I50.9 STAGE C CHRONIC COMBINED CONGESTIVE HEART FAILURE (HCC): Status: ACTIVE | Noted: 2020-10-02

## 2020-10-02 PROCEDURE — 99215 OFFICE O/P EST HI 40 MIN: CPT | Performed by: NURSE PRACTITIONER

## 2020-10-02 PROCEDURE — 93000 ELECTROCARDIOGRAM COMPLETE: CPT | Performed by: NURSE PRACTITIONER

## 2020-10-02 RX ORDER — CARVEDILOL 12.5 MG/1
12.5 TABLET ORAL 2 TIMES DAILY WITH MEALS
Qty: 180 TAB | Refills: 0 | Status: SHIPPED | OUTPATIENT
Start: 2020-10-02 | End: 2020-10-29

## 2020-10-02 RX ORDER — GUAIFENESIN 100 MG/5ML
81 LIQUID (ML) ORAL DAILY
COMMUNITY
Start: 2020-09-21 | End: 2020-10-21

## 2020-10-02 NOTE — PATIENT INSTRUCTIONS
Medication changes:    Increase coreg to 25mg twice daily   If your blood pressure begins to consistently run below 90/60 and/or you begin to experience dizziness or lightheadedness, please contact the [facility] at [phone]. Please take this to your pharmacy to notify them of the change in medications. Testing Ordered:  Labs, [facility] will notify you of any abnormal results  Echocardiogram to be scheduled at your primary cardiologist office  EKG    Other Recommendations:      Ensure your drinking an adequate amount of water with a goal of 6-8 eight ounce glasses (1.5-2 liters) of fluid daily. Your urine should be clear and light yellow straw colored. Follow up 4 weeks with [facility], after echocardiogram      Please monitor your blood pressures daily prior to medications and 2 hours after taking medications. Bring a written record of your blood pressures to your next appointment. Please monitor your weights daily upon waking and after using the bathroom. Keep a written records of your weights and bring to your next appointment. If you have a weight gain of 3 or more pounds overnight OR 5 or more pounds in one week please contact our office. Thank you for allowing us the privilege of being a part of your healthcare team! Please do not hesitate to contact our office at [phone] with any questions or concerns. Low Sodium Diet (2,000 Milligram): Care Instructions  Your Care Instructions     Too much sodium causes your body to hold on to extra water. This can raise your blood pressure and force your heart and kidneys to work harder. In very serious cases, this could cause you to be put in the hospital. It might even be life-threatening. By limiting sodium, you will feel better and lower your risk of serious problems. The most common source of sodium is salt. People get most of the salt in their diet from canned, prepared, and packaged foods. Fast food and restaurant meals also are very high in sodium. Your doctor will probably limit your sodium to less than 2,000 milligrams (mg) a day. This limit counts all the sodium in prepared and packaged foods and any salt you add to your food. Follow-up care is a key part of your treatment and safety. Be sure to make and go to all appointments, and call your doctor if you are having problems. It's also a good idea to know your test results and keep a list of the medicines you take. How can you care for yourself at home? Read food labels  · Read labels on cans and food packages. The labels tell you how much sodium is in each serving. Make sure that you look at the serving size. If you eat more than the serving size, you have eaten more sodium. · Food labels also tell you the Percent Daily Value for sodium. Choose products with low Percent Daily Values for sodium. · Be aware that sodium can come in forms other than salt, including monosodium glutamate (MSG), sodium citrate, and sodium bicarbonate (baking soda). MSG is often added to Asian food. When you eat out, you can sometimes ask for food without MSG or added salt. Buy low-sodium foods  · Buy foods that are labeled \"unsalted\" (no salt added), \"sodium-free\" (less than 5 mg of sodium per serving), or \"low-sodium\" (less than 140 mg of sodium per serving). Foods labeled \"reduced-sodium\" and \"light sodium\" may still have too much sodium. Be sure to read the label to see how much sodium you are getting. · Buy fresh vegetables, or frozen vegetables without added sauces. Buy low-sodium versions of canned vegetables, soups, and other canned goods. Prepare low-sodium meals  · Cut back on the amount of salt you use in cooking. This will help you adjust to the taste. Do not add salt after cooking. One teaspoon of salt has about 2,300 mg of sodium. · Take the salt shaker off the table. · Flavor your food with garlic, lemon juice, onion, vinegar, herbs, and spices. Do not use soy sauce, lite soy sauce, steak sauce, onion salt, garlic salt, celery salt, mustard, or ketchup on your food. · Use low-sodium salad dressings, sauces, and ketchup. Or make your own salad dressings and sauces without adding salt. · Use less salt (or none) when recipes call for it. You can often use half the salt a recipe calls for without losing flavor. Other foods such as rice, pasta, and grains do not need added salt. · Rinse canned vegetables, and cook them in fresh water. This removes somebut not allof the salt. · Avoid water that is naturally high in sodium or that has been treated with water softeners, which add sodium. Call your local water company to find out the sodium content of your water supply. If you buy bottled water, read the label and choose a sodium-free brand. Avoid high-sodium foods  · Avoid eating:  ? Smoked, cured, salted, and canned meat, fish, and poultry. ? Ham, fajardo, hot dogs, and luncheon meats. ? Regular, hard, and processed cheese and regular peanut butter. ? Crackers with salted tops, and other salted snack foods such as pretzels, chips, and salted popcorn. ? Frozen prepared meals, unless labeled low-sodium. ? Canned and dried soups, broths, and bouillon, unless labeled sodium-free or low-sodium. ? Canned vegetables, unless labeled sodium-free or low-sodium. ? [place] fries, pizza, tacos, and other fast foods. ? Pickles, olives, ketchup, and other condiments, especially soy sauce, unless labeled sodium-free or low-sodium. Where can you learn more? Go to http://www.gray.com/  Enter V843 in the search box to learn more about \"Low Sodium Diet (2,000 Milligram): Care Instructions. \"  Current as of: August 22, 2019               Content Version: 12.6  © 3392-3483 [employer]. Care instructions adapted under license by [employer] (which disclaims liability or warranty for this information).  If you have questions about a medical condition or this instruction, always ask your healthcare professional. [address] any warranty or liability for your use of this information. Learning About Heart Failure Zones  What are heart failure zones? Heart failure zones give you an easy way to see changes in your heart failure symptoms. They also tell you when you need to get help. Check every day to see which zone you are in. Green zone. You are doing well. This is where you want to be. · Your weight is stable. It's not going up or down. · You breathe easily. · You are sleeping well. You are able to lie flat without shortness of breath. · You can do your usual activities. Yellow zone. Be careful. Your symptoms are changing. Call your doctor. · You have new or increased shortness of breath. · You are dizzy or lightheaded, or you feel like you may faint. · You have sudden weight gain, such as more than 2 to 3 pounds in a day or 5 pounds in a week. (Your doctor may suggest a different range of weight gain.)  · You have increased swelling in your legs, ankles, or feet. · You are so tired or weak that you can't do your usual activities. · You are not sleeping well. Shortness of breath wakes you up at night. You need extra pillows. Red zone. This is an emergency. Call 911. You have symptoms of sudden heart failure. For example:  · You have severe trouble breathing. · You cough up pink, foamy mucus. · You have a new irregular or fast heartbeat. You have symptoms of a heart attack. These may include:  · Chest pain or pressure, or a strange feeling in the chest.  · Sweating. · Shortness of breath. · Nausea or vomiting. · Pain, pressure, or a strange feeling in the back, neck, jaw, or upper belly or in one or both shoulders or arms. · Lightheadedness or sudden weakness. · A fast or irregular heartbeat.   If you have symptoms of a heart attack: After you call 911, the  may tell you to chew 1 adult-strength or 2 to 4 low-dose aspirin. Wait for an ambulance. Do not try to drive yourself. Follow-up care is a key part of your treatment and safety. Be sure to make and go to all appointments, and call your doctor if you are having problems. It's also a good idea to know your test results and keep a list of the medicines you take. Where can you learn more? Go to http://www.gray.com/  Enter T174 in the search box to learn more about \"Learning About Heart Failure Zones. \"  Current as of: December 16, 2019               Content Version: 12.6  © 1376-7292 [employer]. Care instructions adapted under license by [employer] (which disclaims liability or warranty for this information). If you have questions about a medical condition or this instruction, always ask your healthcare professional. [address] any warranty or liability for your use of this information.

## 2020-10-02 NOTE — PROGRESS NOTES
[facility] Clinic Note      DOS:   10/2/2020  NAME:  Oisris Hagen   MRN:   [id number]   REFERRING PROVIDER:  Dr. Roberto Yang, [facility]  PRIMARY CARE PHYSICIAN: Radha Chambers DO  PRIMARY CARDIOLOGIST: Dr. Suraj Sumner       Chief Complaint:   Chief Complaint   Patient presents with    CHF       HPI: 22y.o. year old male with a history of new onset NICM, frequent PVCs  who presents for further evaluation of his chronic systolic heart failure. He was admitted to [facility] on 5/12/2020 with acute onset of dyspnea and lower extremity edema. He was diuresed and started on GMDT. His SARS-CoV 2 PCR was negative. His viral panel revealed positive coxsackie B5 antibody at 1:64, positive coxsackie B6 antibody at 1:8, parvovirus IgG 8, mycoplasma IgG antibody 457. His EBV IgG > 8. His HIV, Hep C, HBsAg, HHV 6 and CMV were negative. A serum drug screen was negative. His Troponin was elevated at 55 and NT proBNP 8168. His TSH was elevated at 5.42 but free T3 was normal at 3.6 and free T4 was normal at 1.6. His D-dimer was elevated at 2.09. His medications have been limited by symtpomatic hypotension with SBP in the 70s mmHg. He underwent a cardiac MRI on 5/29/2020 which revealed severe biventricular failure with LVEF 19% and RVEF 30%. He did not have gadolinium enhancement. A 24 hour Holter revealed a PVC burden of 19%. Sung Gutierrez presents today for further evaluation of his NICM. He is s/p VT ablation on 9/21/20, by Dr. Nixon Sher. He has a detailed log of his BP and weight. He reports PRATHER and intermittent palpitations with occasional associated with lightheadedness and dizziness. He also denies orthopnea, PND, edema,or syncope. Tolerating entresto and farxiga. Weight is down to 242lbs today. He has not returned to work, where he is exposed to extreme heat, often with temps > 100 F. He is currently on short term disability.     Sung Gutierrez lives with his parents and he has primary custody of his 1year old daughter. He has not consumed alcohol since his diagnosis and denies any use of illicit drugs. He formerly smoked cigarettes on occasion but quit 1 year ago. He quit using smokeless tobacco in July 10, 2020. History:  Past Medical History:   Diagnosis Date    Frequent PVCs     NICM (nonischemic cardiomyopathy) ([facility] [address]      No past surgical history on file.   Social History     Socioeconomic History    Marital status: SINGLE     Spouse name: Not on file    Number of children: 1    Years of education: Not on file    Highest education level: Not on file   Occupational History    Not on file   Social Needs    Financial resource strain: Not on file    Food insecurity     Worry: Not on file     Inability: Not on file    Transportation needs     Medical: Not on file     Non-medical: Not on file   Tobacco Use    Smoking status: Former Smoker     Last attempt to quit: 2019     Years since quittin.7    Smokeless tobacco: Current User     Types: Chew   Substance and Sexual Activity    Alcohol use: Not Currently    Drug use: Never    Sexual activity: Not on file   Lifestyle    Physical activity     Days per week: Not on file     Minutes per session: Not on file    Stress: Not on file   Relationships    Social connections     Talks on phone: Not on file     Gets together: Not on file     Attends Church service: Not on file     Active member of club or organization: Not on file     Attends meetings of clubs or organizations: Not on file     Relationship status: Not on file    Intimate partner violence     Fear of current or ex partner: Not on file     Emotionally abused: Not on file     Physically abused: Not on file     Forced sexual activity: Not on file   Other Topics Concern    Not on file   Social History Narrative    Not on file     Family History   Problem Relation Age of Onset    Heart Failure Maternal Grandmother     Coronary Artery Disease Maternal Grandfather        Current Medications:     Current Outpatient Medications on File Prior to Visit   Medication Sig Dispense Refill    aspirin 81 mg chewable tablet Take 81 mg by mouth daily.  dapagliflozin (FARXIGA) 10 mg tab tablet Take 1 Tab by mouth daily. 30 Tab 5    cyanocobalamin (VITAMIN B12) 500 mcg tablet Take 1 Tab by mouth daily. 100 Tab 3    sacubitriL-valsartan (Entresto)  mg tablet Take 1 Tab by mouth two (2) times a day. 60 Tab 5    carvediloL (COREG) 6.25 mg tablet Take 12.5 mg by mouth two (2) times daily (with meals). No current facility-administered medications on file prior to visit. Allergies: No Known Allergies    ROS:      Review of Systems   Constitutional: Positive for weight loss. HENT: Negative. Eyes: Negative. Respiratory: Positive for shortness of breath. Dyspnea with moderate exertion   Cardiovascular: Positive for palpitations. Negative for orthopnea, leg swelling and PND. Genitourinary: Negative. Musculoskeletal: Negative. Skin: Negative. Neurological: Positive for dizziness. Associated with palpitations    Endo/Heme/Allergies: Negative. Psychiatric/Behavioral: Negative. Admission Weight: Last Weight   Weight: 242 lb 12.8 oz (110.1 kg) Weight: 242 lb 12.8 oz (110.1 kg)       Physical Exam:   Vitals:    Visit Vitals  /80 (BP 1 Location: Left arm, BP Patient Position: At rest)   Pulse 75   Temp 98 °F (36.7 °C) (Oral)   Resp 14   Ht 6' 2\" (1.88 m)   Wt 242 lb 12.8 oz (110.1 kg)   SpO2 98%   BMI 31.17 kg/m²       Julio Elias underwent a 6 min walk test. His initial O2  saturation was   % and his baseline heart rate was   BPM. He walked a distance of  . His post O2  saturation was   % and his post walk heart rate was   BPM.        Physical Exam  Vitals signs reviewed. Constitutional:       Appearance: Normal appearance. HENT:      Head: Normocephalic and atraumatic.       Nose: Nose normal.      Mouth/Throat:      Mouth: Mucous membranes are dry. Eyes:      Pupils: Pupils are equal, round, and reactive to light. Neck:      Musculoskeletal: Normal range of motion and neck supple. Vascular: No JVD. Cardiovascular:      Rate and Rhythm: Normal rate and regular rhythm. Pulses: Normal pulses. Heart sounds: Murmur present. Systolic murmur present with a grade of 2/6. Gallop present. S3 sounds present. Comments: +LifeVest  Pulmonary:      Effort: Pulmonary effort is normal.      Breath sounds: Normal breath sounds. Abdominal:      General: Abdomen is flat. Bowel sounds are normal.      Palpations: Abdomen is soft. Musculoskeletal: Normal range of motion. General: No swelling. Skin:     General: Skin is warm and dry. Neurological:      General: No focal deficit present. Mental Status: He is alert and oriented to person, place, and time. Psychiatric:         Attention and Perception: Attention normal.         Mood and Affect: Mood normal.         Speech: Speech normal.         Behavior: Behavior is cooperative. Recent Labs: No flowsheet data found. EKG:    10/120277:  Sinus  Rhythm   -Left axis for age -possible anterior fascicular block. Voltage criteria for LVH ST/T abnormality may be normal.  Rate 68, QTc 412,     6/30/2020  Sinus  Rhythm  - frequent multiform ectopic ventricular beats   # VECs = 6, # types 2  -Left axis for age -possible anterior fascicular block.    -Poor R-wave progression -nondiagnostic for this age. -  Nonspecific T-abnormality  -Nondiagnostic for age. Echocardiogram:   6/26/2020  ABNORMAL RHYTHM. SEVERELY DILATED LEFT VENTRICULAR CAVITY SIZE WITH SEVERELY DECREASED SYSTOLIC FUNCTION. GLOBAL LV HYPOKINESIS. BIATRIAL ENLARGEMENT. DILATED RIGHT VENTRICLE WITH DECREASED SYSTOLIC FUNCTION. MILD + TRICUSPID VALVE REGURGITATION. MILD MITRAL VALVE  REGURGITATION.    NO SIGNIFICANT IMPROVEMENT COMPARED WITH THE PREVIOUS ECHO PERFORMED ON 5/13/20. Clinical Indications: PVC (premature ventricular contraction); NICM (nonischemic cardiomyopathy) ([facility] [address] Biventricular failure ([facility] [address]   ICD Codes: I49.3; I42.8; I50.82 Technical Quality: Adequate     MEASUREMENTS:  (Male / Female) Normal Values   2D ECHO    RV Diameter                       5.4 cm                1.3-1.2   LV Diastolic Diameter Base LX     8 cm                  4.2-5.8 / 6.9-3.2   LV Systolic Diameter Base LX      7.6 cm                2.5-4.0 / 5.8-4.4   IVS Diastolic Thickness           1.1 cm                0.6-1.0 / 4.7-8.3   LVPW Diastolic Thickness          1.1 cm                0.6-1.0 / 9.1-6.2   LA Systolic Diameter LX           4.4 cm                2.1-3.7 cm   Aortic Root Diameter              2.7 cm                3.7-0.8 cm   LA Systolic Pressure              30.5 mmHg   LA Area 4C View                   28.9 cm²   LA Area 2C View                   28.5 cm²   LA Length 4C                      6.2 cm   LA Length 2C                      6.1 cm   LA Volume                         106 cm³   PV Peak Gradient                  1 mmHg       DOPPLER    AV Peak Velocity                  79.5 cm/s   AV Peak Gradient                  2.5 mmHg   LVOT Peak Velocity                51.3 cm/s   LVOT Peak Gradient                1.1 mmHg   Mitral E Point Velocity           89.3 cm/s   Mitral  A Point Velocity          42 cm/s   Mitral E to A Ratio               2.1                   1.0 to 1.5   Mitral E to LV E' Septal Ratio    19.4   Mitral E to LV E' Lateral Ratio   22.9   MV Deceleration Time              102 ms                160-240 ms   E Prime Velocity                  3.9 cm/s   PV Peak Velocity                  54.3 cm/s   PV Peak Gradient                  1.2 mmHg   TV Peak Velocity                  69.6 cm/s   TR Peak Velocity                  2.9 m/s   TR Peak Gradient                  32.7 mmHg      Cardiac MRI:   6/2/2020  1.  No delayed myocardial enhancement to suggest scar/fibrosis or infiltrative cardiomyopathy. 2. Severely dilated LV and RV. 3. Biatrial enlargement. 4. Severely depressed LV systolic function (LVEF 17%). Accurate measurement of LV size and function is limited due to arrhythmia artifact. 5. Severely depressed RV systolic function (RVEF 29%). Accurate measurement of RV size and function is limited due to arrhythmia artifact. 6. Moderate mitral regurgitation. 7. Mild tricuspid regurgitation. 8. Small pericardial effusion. 9. Small bilateral pleural effusions    Electronically signed by S: Kelly Lau MD [id number] 14:56:36     Radiologic interpretation of noncardiac findings: The imaged portions of the lungs demonstrate small pleural effusions. The imaged portions of the abdominal viscera demonstrates a small amount of perihepatic ascites. No suspicious osseous findings. CONCLUSION:  No significant noncardiac findings. Signed By: Cece Watkins MD on 6/2/2020 12:35 PM      Radiology (CXR, CT scans):   CTA - 5/13/2020  1. Negative for pulmonary embolism. 2. Trace right pleural effusion and central bronchial airway thickening. 3. Basilar predominant vascular congestion. 4. Cardiomegaly. Impression / Plan:   1.  NICM - Stage C, NYHA Class III, LVEF 19% by cMRI, 18% by TTE   Continue entresto to 97/103 mg, 1 tablet twice daily   Increase coreg to 25mg po BID   Unable to tolerate Spironolactone due to hyperkalemia, will check labs and try to resume    Continue farxiga 10 mg daily   Take furosemide 20 mg, 1 tablet daily ONLY AS NEEDED for shortness of breath     Viral panel notable for postive abs to coxsackie, parvovirus B19, mycoplasma, EBV   cMRI revealed biventricular failure with no gadolinium enhancemen         Recommend low Na+/low K+ diet   Recommend daily weights and BP measurements    Avoid all cardiotoxic drugs - alcohol, tobacco   Check CBC, CMP, NT proBNP, Mg   Previously discussed treatment strategy with GDMT and potential for advanced therapies including heart transplant and LVAD             Follow up with [facility] in 4 weeks after echo               2. High Risk of SCD, LVEF 19%   Encourage continued use of LifeVest   Discuss AICD with Dr. Alcantara   Schedule Repeat TTE     3. Frequent PVCs - 19% on 24 hour Holter, s/p PVC ablation   Continue to optimize GDMT   Keep K> 4 and Mg> 2   Increase coreg to 25mg BID    Check EKG today, SR rate 68bpm    4. Functional Mitral Regurgitation   Reassess once GDMT is optimized   Repeat TTE on 8/17/2020 with moderate MR    Schedule repeat TTE    5. History of Nicotine Addiction   Former cigarette use   Last day of smokeless tobacco use - July 9, 2020   Discussed transplant requirements to be tobacco free for > 6 months - patient understands   Does not wish to take Wellbutrin   Tobacco cessation counseling, > 10 minutes     6. STOP BANG 4             Recommend outpatient PSG - will discuss with his PCP at his next visit     7.  Macrocytic Anemia   Continue vitamin B12 500 mcgs daily   Check CBC      David Christina NP  [address] [facility]  [address]  Office [phone]  Fax [phone]

## 2020-10-03 LAB
ALBUMIN SERPL-MCNC: 4.8 G/DL (ref 4.1–5.2)
ALBUMIN/GLOB SERPL: 1.8 {RATIO} (ref 1.2–2.2)
ALP SERPL-CCNC: 135 IU/L (ref 39–117)
ALT SERPL-CCNC: 42 IU/L (ref 0–44)
AST SERPL-CCNC: 29 IU/L (ref 0–40)
BILIRUB SERPL-MCNC: 0.7 MG/DL (ref 0–1.2)
BUN SERPL-MCNC: 12 MG/DL (ref 6–20)
BUN/CREAT SERPL: 17 (ref 9–20)
CALCIUM SERPL-MCNC: 9.9 MG/DL (ref 8.7–10.2)
CHLORIDE SERPL-SCNC: 102 MMOL/L (ref 96–106)
CO2 SERPL-SCNC: 27 MMOL/L (ref 20–29)
CREAT SERPL-MCNC: 0.69 MG/DL (ref 0.76–1.27)
ERYTHROCYTE [DISTWIDTH] IN BLOOD BY AUTOMATED COUNT: 13.4 % (ref 11.6–15.4)
GLOBULIN SER CALC-MCNC: 2.7 G/DL (ref 1.5–4.5)
GLUCOSE SERPL-MCNC: 100 MG/DL (ref 65–99)
HCT VFR BLD AUTO: 44.4 % (ref 37.5–51)
HGB BLD-MCNC: 15.7 G/DL (ref 13–17.7)
MAGNESIUM SERPL-MCNC: 2 MG/DL (ref 1.6–2.3)
MCH RBC QN AUTO: 31.7 PG (ref 26.6–33)
MCHC RBC AUTO-ENTMCNC: 35.4 G/DL (ref 31.5–35.7)
MCV RBC AUTO: 90 FL (ref 79–97)
NT-PROBNP SERPL-MCNC: 1348 PG/ML (ref 0–86)
PLATELET # BLD AUTO: 237 X10E3/UL (ref 150–450)
POTASSIUM SERPL-SCNC: 4.6 MMOL/L (ref 3.5–5.2)
PROT SERPL-MCNC: 7.5 G/DL (ref 6–8.5)
RBC # BLD AUTO: 4.95 X10E6/UL (ref 4.14–5.8)
SODIUM SERPL-SCNC: 142 MMOL/L (ref 134–144)
WBC # BLD AUTO: 7.5 X10E3/UL (ref 3.4–10.8)

## 2020-10-29 ENCOUNTER — TELEPHONE (OUTPATIENT)
Dept: CARDIOLOGY CLINIC | Age: 25
End: 2020-10-29

## 2020-10-29 ENCOUNTER — VIRTUAL VISIT (OUTPATIENT)
Dept: CARDIOLOGY CLINIC | Age: 25
End: 2020-10-29

## 2020-10-29 DIAGNOSIS — I42.8 NICM (NONISCHEMIC CARDIOMYOPATHY) (HCC): Primary | ICD-10-CM

## 2020-10-29 DIAGNOSIS — R00.2 PALPITATIONS: ICD-10-CM

## 2020-10-29 DIAGNOSIS — I50.20 NYHA CLASS 3 HEART FAILURE WITH REDUCED EJECTION FRACTION (HCC): ICD-10-CM

## 2020-10-29 DIAGNOSIS — D53.9 MACROCYTIC ANEMIA: ICD-10-CM

## 2020-10-29 DIAGNOSIS — R06.09 DOE (DYSPNEA ON EXERTION): ICD-10-CM

## 2020-10-29 PROCEDURE — 99214 OFFICE O/P EST MOD 30 MIN: CPT | Performed by: NURSE PRACTITIONER

## 2020-10-29 RX ORDER — CARVEDILOL 25 MG/1
25 TABLET ORAL 2 TIMES DAILY WITH MEALS
Qty: 60 TAB | Refills: 2 | Status: SHIPPED | OUTPATIENT
Start: 2020-10-29

## 2020-10-29 RX ORDER — ASPIRIN 81 MG/1
81 TABLET ORAL DAILY
COMMUNITY

## 2020-10-29 RX ORDER — SACUBITRIL AND VALSARTAN 97; 103 MG/1; MG/1
1 TABLET, FILM COATED ORAL 2 TIMES DAILY
Qty: 60 TAB | Refills: 5 | Status: SHIPPED | OUTPATIENT
Start: 2020-10-29 | End: 2020-11-03

## 2020-10-29 NOTE — PATIENT INSTRUCTIONS
Medication changes:    INCREASE your Coreg (carvedilol) to 25 mg by mouth twice daily. Until you  the new prescription, you may take 2 of the 12.5 mg tablets twice daily. Please take this to your pharmacy to notify them of the change in medications. Testing Ordered: We have ordered an echocardiogram for you. A member of the scheduling team will contact you with further instructions. Please have this done prior to your next visit with us. Other Recommendations:      Ensure your drinking an adequate amount of water with a goal of 6-8 eight ounce glasses (1.5-2 liters) of fluid daily. Your urine should be clear and light yellow straw colored. If your blood pressure begins to consistently run below 90/60 and/or you begin to experience dizziness or lightheadedness, please contact the [facility] at [phone]. Follow up in 1 month with [facility] to discuss your test results. Please monitor your blood pressures daily prior to medications and 2 hours after taking medications. Bring a written record of your blood pressures to your next appointment. Please monitor your weights daily upon waking and after using the bathroom. Keep a written records of your weights and bring to your next appointment. If you have a weight gain of 3 or more pounds overnight OR 5 or more pounds in one week please contact our office. Thank you for allowing us the privilege of being a part of your healthcare team! Please do not hesitate to contact our office at [phone] with any questions or concerns.

## 2020-10-29 NOTE — TELEPHONE ENCOUNTER
Patient needs TTE at next cardiology appointment with Dr. Bora Trevizo. LM with request to schedule TTE at next OV. Faxed order.   Humberto Butcher RN

## 2020-10-29 NOTE — PROGRESS NOTES
[facility] Virtual Visit      DOS:   10/29/2020  NAME:  Osiris Hagen   MRN:   [id number]   REFERRING PROVIDER:  Dr. Roberto Yang, [facility]  PRIMARY CARE PHYSICIAN: Radha Chambers DO  PRIMARY CARDIOLOGIST: Dr. Pastor Valerio, who was evaluated through a synchronous (real-time) audio-video encounter, and/or his healthcare decision maker, is aware that it is a billable service, with coverage as determined by his insurance carrier. He provided verbal consent to proceed: Yes, and patient identification was verified. It was conducted pursuant to the emergency declaration under the [facility], [address] [place] [address] authority and the [employer] Act. A caregiver was present when appropriate. Ability to conduct physical exam was limited. I was in the office. The patient was at home. Chief Complaint:   Chief Complaint   Patient presents with    CHF       HPI: 22y.o. year old male with a history of new onset NICM, frequent PVCs  who presents for further evaluation of his chronic systolic heart failure. He was admitted to [facility] on 5/12/2020 with acute onset of dyspnea and lower extremity edema. He was diuresed and started on GMDT. His SARS-CoV 2 PCR was negative. His viral panel revealed positive coxsackie B5 antibody at 1:64, positive coxsackie B6 antibody at 1:8, parvovirus IgG 8, mycoplasma IgG antibody 457. His EBV IgG > 8. His HIV, Hep C, HBsAg, HHV 6 and CMV were negative. A serum drug screen was negative. His Troponin was elevated at 55 and NT proBNP 8168. His TSH was elevated at 5.42 but free T3 was normal at 3.6 and free T4 was normal at 1.6. His D-dimer was elevated at 2.09. His medications have been limited by symtpomatic hypotension with SBP in the 70s mmHg. He underwent a cardiac MRI on 5/29/2020 which revealed severe biventricular failure with LVEF 19% and RVEF 30%.  He did not have gadolinium enhancement. A 24 hour Holter revealed a PVC burden of 19%. Girma Bowens presents today for further evaluation of his NICM via virtual visit ([facility]). He is s/p VT ablation on 20, by Dr. Quincy Coon. He denies dyspnea, orthopnea, PND, edema,or syncope. He is tolerating entresto and farxiga. His weight is up from 242 lbs to 254 lbs but denies any s/s of volume overload and relates this to increased caloric intake. He has not had to take any PRN furosemide. His Coreg was increased to 25 mg PO BID at his last visit but he has not increased the dose yet. He is currently on short term disability. Girma Bowens lives with his parents and he has primary custody of his 1year old daughter. He has not consumed alcohol since his diagnosis and denies any use of illicit drugs. He formerly smoked cigarettes on occasion but quit 1 year ago. He quit using smokeless tobacco in July 10, 2020. History:  Past Medical History:   Diagnosis Date    Frequent PVCs     NICM (nonischemic cardiomyopathy) ([facility] [address]      No past surgical history on file.   Social History     Socioeconomic History    Marital status: SINGLE     Spouse name: Not on file    Number of children: 1    Years of education: Not on file    Highest education level: Not on file   Occupational History    Not on file   Social Needs    Financial resource strain: Not on file    Food insecurity     Worry: Not on file     Inability: Not on file    Transportation needs     Medical: Not on file     Non-medical: Not on file   Tobacco Use    Smoking status: Former Smoker     Last attempt to quit: 2019     Years since quittin.8    Smokeless tobacco: Current User     Types: Chew   Substance and Sexual Activity    Alcohol use: Not Currently    Drug use: Never    Sexual activity: Not on file   Lifestyle    Physical activity     Days per week: Not on file     Minutes per session: Not on file    Stress: Not on file   Relationships    Social connections Talks on phone: Not on file     Gets together: Not on file     Attends Latter-day service: Not on file     Active member of club or organization: Not on file     Attends meetings of clubs or organizations: Not on file     Relationship status: Not on file    Intimate partner violence     Fear of current or ex partner: Not on file     Emotionally abused: Not on file     Physically abused: Not on file     Forced sexual activity: Not on file   Other Topics Concern    Not on file   Social History Narrative    Not on file     Family History   Problem Relation Age of Onset    Heart Failure Maternal Grandmother     Coronary Artery Disease Maternal Grandfather        Current Medications:     Current Outpatient Medications on File Prior to Visit   Medication Sig Dispense Refill    aspirin delayed-release 81 mg tablet Take 81 mg by mouth daily.  carvediloL (COREG) 12.5 mg tablet Take 1 Tab by mouth two (2) times daily (with meals). 180 Tab 0    dapagliflozin (FARXIGA) 10 mg tab tablet Take 1 Tab by mouth daily. 30 Tab 5    cyanocobalamin (VITAMIN B12) 500 mcg tablet Take 1 Tab by mouth daily. 100 Tab 3    sacubitriL-valsartan (Entresto)  mg tablet Take 1 Tab by mouth two (2) times a day. 60 Tab 5     No current facility-administered medications on file prior to visit. Allergies: No Known Allergies    ROS:      Review of Systems   Constitutional: Negative. HENT: Negative. Eyes: Negative. Respiratory: Negative. Cardiovascular: Negative. Negative for orthopnea, leg swelling and PND. Gastrointestinal: Negative. Genitourinary: Negative. Musculoskeletal: Negative. Skin: Negative. Neurological: Negative. Endo/Heme/Allergies: Negative. Psychiatric/Behavioral: Negative.           Admission Weight: Last Weight             Physical Exam:   Vitals:    Patient-Reported Vitals 10/29/2020   Patient-Reported Weight 254lb   Patient-Reported Pulse 80BPM   Patient-Reported Systolic  909   Patient-Reported Diastolic 76      Physical exam deferred due to nature of call. Recent Labs:   Labs Latest Ref Rng & Units 10/2/2020   WBC 3.4 - 10.8 x10E3/uL 7.5   RBC 4.14 - 5.80 x10E6/uL 4.95   Hemoglobin 13.0 - 17.7 g/dL 15.7   Hematocrit 37.5 - 51.0 % 44.4   MCV 79 - 97 fL 90   Platelets 809 - 352 R64V3/   Albumin 4.1 - 5.2 g/dL 4.8   Calcium 8.7 - 10.2 mg/dL 9.9   Glucose 65 - 99 mg/dL 100(H)   BUN 6 - 20 mg/dL 12   Creatinine 0.76 - 1.27 mg/dL 0.69(L)   Sodium 134 - 144 mmol/L 142   Potassium 3.5 - 5.2 mmol/L 4.6   Some recent data might be hidden          EKG:    10/446630:  Sinus  Rhythm   -Left axis for age -possible anterior fascicular block. Voltage criteria for LVH ST/T abnormality may be normal.  Rate 68, QTc 412,     6/30/2020  Sinus  Rhythm  - frequent multiform ectopic ventricular beats   # VECs = 6, # types 2  -Left axis for age -possible anterior fascicular block.    -Poor R-wave progression -nondiagnostic for this age. -  Nonspecific T-abnormality  -Nondiagnostic for age. Echocardiogram:   6/26/2020  ABNORMAL RHYTHM. SEVERELY DILATED LEFT VENTRICULAR CAVITY SIZE WITH SEVERELY DECREASED SYSTOLIC FUNCTION. GLOBAL LV HYPOKINESIS. BIATRIAL ENLARGEMENT. DILATED RIGHT VENTRICLE WITH DECREASED SYSTOLIC FUNCTION. MILD + TRICUSPID VALVE REGURGITATION. MILD MITRAL VALVE  REGURGITATION. NO SIGNIFICANT IMPROVEMENT COMPARED WITH THE PREVIOUS ECHO PERFORMED ON 5/13/20. Clinical Indications: PVC (premature ventricular contraction); NICM (nonischemic cardiomyopathy) ([address]    Biventricular failure ([address]   ICD Codes: I49.3; I42.8; I50.82 Technical Quality: Adequate     MEASUREMENTS:  (Male / Female) Normal Values   2D ECHO    RV Diameter                       5.4 cm                7.7-8.8   LV Diastolic Diameter Base LX     8 cm                  4.2-5.8 / 0.1-9.6   LV Systolic Diameter Base LX      7.6 cm                2.5-4.0 / 2.2-3.5   IVS Diastolic Thickness           1.1 cm                0.6-1.0 / 5.6-0.9   LVPW Diastolic Thickness          1.1 cm                0.6-1.0 / 1.1-9.2   LA Systolic Diameter LX           4.4 cm                2.1-3.7 cm   Aortic Root Diameter              2.7 cm                6.4-6.9 cm   LA Systolic Pressure              30.5 mmHg   LA Area 4C View                   28.9 cm²   LA Area 2C View                   28.5 cm²   LA Length 4C                      6.2 cm   LA Length 2C                      6.1 cm   LA Volume                         106 cm³   PV Peak Gradient                  1 mmHg       DOPPLER    AV Peak Velocity                  79.5 cm/s   AV Peak Gradient                  2.5 mmHg   LVOT Peak Velocity                51.3 cm/s   LVOT Peak Gradient                1.1 mmHg   Mitral E Point Velocity           89.3 cm/s   Mitral  A Point Velocity          42 cm/s   Mitral E to A Ratio               2.1                   1.0 to 1.5   Mitral E to LV E' Septal Ratio    19.4   Mitral E to LV E' Lateral Ratio   22.9   MV Deceleration Time              102 ms                160-240 ms   E Prime Velocity                  3.9 cm/s   PV Peak Velocity                  54.3 cm/s   PV Peak Gradient                  1.2 mmHg   TV Peak Velocity                  69.6 cm/s   TR Peak Velocity                  2.9 m/s   TR Peak Gradient                  32.7 mmHg      Cardiac MRI:   6/2/2020  1. No delayed myocardial enhancement to suggest scar/fibrosis or infiltrative cardiomyopathy. 2. Severely dilated LV and RV. 3. Biatrial enlargement. 4. Severely depressed LV systolic function (LVEF 75%). Accurate measurement of LV size and function is limited due to arrhythmia artifact. 5. Severely depressed RV systolic function (RVEF 74%). Accurate measurement of RV size and function is limited due to arrhythmia artifact. 6. Moderate mitral regurgitation. 7. Mild tricuspid regurgitation. 8. Small pericardial effusion.    9. Small bilateral pleural effusions    Electronically signed by [facility]: Mal Alejandre MD [id number] 14:56:36     Radiologic interpretation of noncardiac findings: The imaged portions of the lungs demonstrate small pleural effusions. The imaged portions of the abdominal viscera demonstrates a small amount of perihepatic ascites. No suspicious osseous findings. CONCLUSION:  No significant noncardiac findings. Signed By: Carolin Rush MD on 6/2/2020 12:35 PM      Radiology (CXR, CT scans):   CTA - 5/13/2020  1. Negative for pulmonary embolism. 2. Trace right pleural effusion and central bronchial airway thickening. 3. Basilar predominant vascular congestion. 4. Cardiomegaly. Impression / Plan:   1. NICM - Stage C, NYHA Class III, LVEF 19% by cMRI, 18% by TTE   Repeat TTE - order placed, will send to local cardiology office for completion    Continue entresto to 97/103 mg, 1 tablet twice daily   Increase coreg to 25mg po BID; new rx sent    Unable to tolerate Spironolactone due to hyperkalemia, will check labs at next visit and try to resume +/- add Veltassa    Continue farxiga 10 mg daily   Take furosemide 20 mg, 1 tablet PRN for shortness of breath     Viral panel notable for postive abs to coxsackie, parvovirus B19, mycoplasma, EBV   cMRI revealed biventricular failure with no gadolinium enhancemen         Recommend low Na+/low K+ diet   Recommend daily weights and BP measurements    Avoid all cardiotoxic drugs - alcohol, tobacco   Check CBC, CMP, NT proBNP, Mg prior to next visit    Previously discussed treatment strategy with GDMT and potential for advanced therapies including heart transplant and LVAD; will reassess after TTE              Follow up with [facility] in 4 weeks to discuss results of TTE                2. High Risk of SCD, LVEF 19%   Encourage continued use of LifeVest   Schedule Repeat TTE    Will need ICD if no improvement in EF     3.  Frequent PVCs - 19% on 24 hour Holter, s/p PVC ablation   Continue to optimize GDMT   Keep K> 4 and Mg> 2   Increase coreg to 25mg BID    Check EKG today, SR rate 68bpm    4. Functional Mitral Regurgitation   Reassess once GDMT is optimized   Repeat TTE on 8/17/2020 with moderate MR    Schedule repeat TTE    5. History of Nicotine Addiction   Former cigarette use   Last day of smokeless tobacco use - July 9, 2020   Discussed transplant requirements to be tobacco free for > 6 months - patient understands   Does not wish to take Wellbutrin     6.  STOP BANG 4             Recommend outpatient PSG         KEN Reza  [facility], [address] [place] [address]  Office [phone]  Fax [phone]

## 2020-10-30 ENCOUNTER — TELEPHONE (OUTPATIENT)
Dept: CARDIOLOGY CLINIC | Age: 25
End: 2020-10-30

## 2020-11-02 ENCOUNTER — TELEPHONE (OUTPATIENT)
Dept: CARDIOLOGY CLINIC | Age: 25
End: 2020-11-02

## 2020-11-02 DIAGNOSIS — I50.20 NYHA CLASS 3 HEART FAILURE WITH REDUCED EJECTION FRACTION (HCC): Primary | ICD-10-CM

## 2020-11-02 NOTE — TELEPHONE ENCOUNTER
Contacted patient to inquire if he has used 30 day free trial card. Patient confirmed he has. Informed him may set up patient assistance, but will need medication in the meantime as he is completely out. Informed patient will review with provider and contact him with further recommendations. Patient verbalized understanding and had no further questions. Keegan Hutchinson RN.

## 2020-11-02 NOTE — TELEPHONE ENCOUNTER
----- Message from Figueroa Cardoso sent at 11/2/2020 11:49 AM EST -----  Regarding: Prescription Question  Contact: [phone]  My health insurance got canceled without them telling me . I can not afford to pay 651.00 for my entrestro. [address] has it filled but i dont have that kind of money be off work since may . I need my entrestro im out . Is there any free trial coupons you can send there or anything to make it cheaper or a different medication. Please get back with me asap .  [phone] thanks vivi Olvera

## 2020-11-03 RX ORDER — LOSARTAN POTASSIUM 50 MG/1
50 TABLET ORAL DAILY
Qty: 30 TAB | Refills: 2 | Status: SHIPPED | OUTPATIENT
Start: 2020-11-03

## 2020-11-03 NOTE — TELEPHONE ENCOUNTER
Jenny Sosa NP  You 11 minutes ago (3:06 PM)       Yes, he can  d/c Entresto and begin losartan 50 mg PO daily. Message text      New prescription sent. Contacted patient to notify. He verbalized understanding and had no further questions. Samuel Menon RN.

## 2020-11-06 ENCOUNTER — TELEPHONE (OUTPATIENT)
Dept: CARDIOLOGY CLINIC | Age: 25
End: 2020-11-06

## 2020-11-06 NOTE — TELEPHONE ENCOUNTER
Scheduled patient's follow up appt for 12/2 with Leona KHAN Patient states that someone was to set up an ECHO for him in [facility], he states that no one has called him and ask that I follow up on it.

## 2020-11-06 NOTE — TELEPHONE ENCOUNTER
Called patient and advised order faxed to Dr. Song Schofield scheduling dept and he would need to call that office and inquire if the Echo has been scheduled. Patient states he has number and had no further questions.  Humberto Butcher RN

## 2021-11-17 NOTE — TELEPHONE ENCOUNTER
Returned call to patient to schedule a follower up appointment. Patient is scheduled on Friday October 2nd at 2pm w/C.  Efren Tavarez NP Additional Area 5 Units: 0

## 2022-03-18 PROBLEM — I49.3 FREQUENT PVCS: Status: ACTIVE | Noted: 2020-10-02

## 2022-03-19 PROBLEM — I50.9 STAGE C CHRONIC COMBINED CONGESTIVE HEART FAILURE (HCC): Status: ACTIVE | Noted: 2020-10-02

## 2022-03-19 PROBLEM — R06.09 DOE (DYSPNEA ON EXERTION): Status: ACTIVE | Noted: 2020-10-02

## 2022-03-19 PROBLEM — R00.2 PALPITATIONS: Status: ACTIVE | Noted: 2020-10-02

## 2022-03-19 PROBLEM — I50.20 NYHA CLASS 3 HEART FAILURE WITH REDUCED EJECTION FRACTION (HCC): Status: ACTIVE | Noted: 2020-10-02

## 2022-03-20 PROBLEM — I42.8 NICM (NONISCHEMIC CARDIOMYOPATHY) (HCC): Status: ACTIVE | Noted: 2020-10-02

## 2023-05-21 RX ORDER — CARVEDILOL 25 MG/1
25 TABLET ORAL 2 TIMES DAILY WITH MEALS
COMMUNITY
Start: 2020-10-29

## 2023-05-21 RX ORDER — ASPIRIN 81 MG/1
81 TABLET ORAL DAILY
COMMUNITY

## 2023-05-21 RX ORDER — LOSARTAN POTASSIUM 50 MG/1
50 TABLET ORAL DAILY
COMMUNITY
Start: 2020-11-03